# Patient Record
Sex: FEMALE | Race: WHITE | NOT HISPANIC OR LATINO | Employment: FULL TIME | URBAN - METROPOLITAN AREA
[De-identification: names, ages, dates, MRNs, and addresses within clinical notes are randomized per-mention and may not be internally consistent; named-entity substitution may affect disease eponyms.]

---

## 2017-03-10 ENCOUNTER — TELEPHONE (OUTPATIENT)
Dept: CARDIOLOGY | Facility: CLINIC | Age: 57
End: 2017-03-10

## 2017-03-10 DIAGNOSIS — I21.4 NSTEMI (NON-ST ELEVATED MYOCARDIAL INFARCTION) (H): ICD-10-CM

## 2017-03-10 RX ORDER — ATORVASTATIN CALCIUM 40 MG/1
40 TABLET, FILM COATED ORAL DAILY
Qty: 90 TABLET | Refills: 3 | Status: SHIPPED | OUTPATIENT
Start: 2017-03-10 | End: 2018-03-12

## 2017-03-10 NOTE — TELEPHONE ENCOUNTER
Received a vm from pt stating that she is out of her atorvastatin and would like a refill.    Writer called pt back and refilled atorvastatin 40mg to pt pharmacy of choice. Pt was thankful. Pt has no further questions or concerns at this time.

## 2017-03-27 DIAGNOSIS — E78.5 DYSLIPIDEMIA: ICD-10-CM

## 2017-03-27 LAB
CHOLEST SERPL-MCNC: 140 MG/DL
HDLC SERPL-MCNC: 50 MG/DL
LDLC SERPL CALC-MCNC: 43 MG/DL
NONHDLC SERPL-MCNC: 90 MG/DL
TRIGL SERPL-MCNC: 236 MG/DL

## 2017-03-27 PROCEDURE — 36415 COLL VENOUS BLD VENIPUNCTURE: CPT | Performed by: INTERNAL MEDICINE

## 2017-03-27 PROCEDURE — 80061 LIPID PANEL: CPT | Performed by: INTERNAL MEDICINE

## 2017-03-28 ENCOUNTER — TELEPHONE (OUTPATIENT)
Dept: CARDIOLOGY | Facility: CLINIC | Age: 57
End: 2017-03-28

## 2017-03-28 DIAGNOSIS — E78.2 MIXED HYPERLIPIDEMIA: Primary | ICD-10-CM

## 2017-03-28 NOTE — TELEPHONE ENCOUNTER
Notes Recorded by Dominick Huffman MD on 3/28/2017 at 12:37 AM  Improved FLP with better HDL.  Triglycerides remain elevated, possibly due to genetics or diet.  Suggest continued medical therapy, diet and exercise.  Would repeat her FLP in one year.  Dominick Huffman MD, [facility]  March 28, 2017 12:37 AM    Writer attempted to call pt with above result. No answer. Writer left non urgent vm to call team 4 back directly.     Pt called back. Writer informed her of Dr. Huffman above response. Writer placed order for FLP in one year. Pt was wondering if she was supposed to follow up in sept. Writer verified this and told pt to call around July to schedule. Pt verbalized understanding. No further questions or concerns at this time.

## 2017-03-30 ENCOUNTER — TELEPHONE (OUTPATIENT)
Dept: CARDIOLOGY | Facility: CLINIC | Age: 57
End: 2017-03-30

## 2017-03-30 ENCOUNTER — PRE VISIT (OUTPATIENT)
Dept: CARDIOLOGY | Facility: CLINIC | Age: 57
End: 2017-03-30

## 2017-03-30 NOTE — TELEPHONE ENCOUNTER
Pt called stating that she needs Dr. 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 Writer called pt back and informed pt that Dr. Huffman is currently out of the office and writer is unable to reach him today. Writer told pt that Dr. Huffman may order further testing prior to signing DOT clearance or may possibly want to see pt in the office. Pt verbalized understanding. Writer instructed pt that Dr. Huffman will be updated and see what he would like pt to do. Pt verbalized understanding and was thankful for a prompt response.     Writer noticed that Dr. Huffman had follow up appts available on 3/31/17. Writer attempted to call pt back to offer appt. No answer. Writer left vm to call team 4 back directly.     Pt called back and writer informed pt that Dr. Huffman has availability on 3/31/17 in [place]. Pt would like to be seen. [place] scheduled appt with Dr. Huffman on 3/31/17 at 12:45.    Chart reviewed: BROOKLYN Huffman 9/27/16:    ASSESSMENT:   1. Tania Azevedo is a delightful 55-year-old female status post inferior wall myocardial infarction in 02/2015 with an ejection fraction at that time of 40%-45%. A stress echocardiogram was then performed on 02/26/2016 demonstrating the old inferior wall MI, but no areas of ischemia. Her ejection fraction had improved to 55%-60% with moderate inferior wall hypokinesis. I have encouraged her to return to her exercise both for weight loss as well as her physical well-being and mental well-being.   2. Tobacco abuse. The patient continues to refrain from smoking.   3. Morbid obesity.       I spent greater than 50% of my 30-minute visit with her today, specifically discussing diet, exercise, weight loss and coronary artery disease.       The patient has the metabolic syndrome and I suspect she has pattern B lipids. Per the patient's request, we will delay checking her lipids at this time for 6 months as she tries to  adjust her lifestyle to all the changes.       It is my pleasure to assist in this patient's care. I will see her in 1 year with her lipids in 6 months. She is otherwise stable. She will call for recurrent angina. All her questions were answered to her satisfaction.     Writer will route to Dr. Huffman to update him that pt has an appt scheduled with him on 3/31/17 to discuss DOT clearance. Writer will see if  Dr. Huffman would like to keep this appt or if he would just like to do testing without seeing the patient in office.

## 2017-03-31 ENCOUNTER — OFFICE VISIT (OUTPATIENT)
Dept: CARDIOLOGY | Facility: CLINIC | Age: 57
End: 2017-03-31
Payer: COMMERCIAL

## 2017-03-31 VITALS
HEART RATE: 60 BPM | SYSTOLIC BLOOD PRESSURE: 160 MMHG | DIASTOLIC BLOOD PRESSURE: 80 MMHG | HEIGHT: 61 IN | WEIGHT: 214.4 LBS | BODY MASS INDEX: 40.48 KG/M2

## 2017-03-31 DIAGNOSIS — I10 ESSENTIAL HYPERTENSION: ICD-10-CM

## 2017-03-31 DIAGNOSIS — I25.10 CORONARY ARTERY DISEASE INVOLVING NATIVE CORONARY ARTERY OF NATIVE HEART WITHOUT ANGINA PECTORIS: Primary | ICD-10-CM

## 2017-03-31 DIAGNOSIS — I21.4 NSTEMI (NON-ST ELEVATED MYOCARDIAL INFARCTION) (H): ICD-10-CM

## 2017-03-31 DIAGNOSIS — E78.2 MIXED HYPERLIPIDEMIA: ICD-10-CM

## 2017-03-31 PROCEDURE — 99214 OFFICE O/P EST MOD 30 MIN: CPT | Performed by: INTERNAL MEDICINE

## 2017-03-31 RX ORDER — NITROGLYCERIN 0.4 MG/1
0.4 TABLET SUBLINGUAL EVERY 5 MIN PRN
Qty: 25 TABLET | Refills: 3 | Status: SHIPPED | OUTPATIENT
Start: 2017-03-31 | End: 2020-02-28

## 2017-03-31 NOTE — PROGRESS NOTES
HPI and Plan:   See dictation:[id number]    No orders of the defined types were placed in this encounter.      Orders Placed This Encounter   Medications     nitroglycerin (NITROSTAT) 0.4 MG sublingual tablet     Sig: Place 1 tablet (0.4 mg) under the tongue every 5 minutes as needed for chest pain     Dispense:  25 tablet     Refill:  3       Medications Discontinued During This Encounter   Medication Reason     oxyCODONE (ROXICODONE) 5 MG immediate release tablet      senna-docusate (SENOKOT S) 8.6-50 MG per tablet      nitroglycerin (NITROSTAT) 0.4 MG SL tablet Reorder         Encounter Diagnoses   Name Primary?     Coronary artery disease involving native coronary artery of native heart without angina pectoris Yes     Essential hypertension      NSTEMI (non-ST elevated myocardial infarction) (H)        CURRENT MEDICATIONS:  Current Outpatient Prescriptions   Medication Sig Dispense Refill     nitroglycerin (NITROSTAT) 0.4 MG sublingual tablet Place 1 tablet (0.4 mg) under the tongue every 5 minutes as needed for chest pain 25 tablet 3     atorvastatin (LIPITOR) 40 MG tablet Take 1 tablet (40 mg) by mouth daily 90 tablet 3     pantoprazole (PROTONIX) 40 MG enteric coated tablet Take 1 tablet (40 mg) by mouth daily 90 tablet 3     metoprolol (LOPRESSOR) 25 MG tablet Take 1 tablet (25 mg) by mouth 2 times daily 180 tablet 3     irbesartan (AVAPRO) 300 MG tablet Take 0.5 tablets (150 mg) by mouth daily 45 tablet 3     sertraline (ZOLOFT) 50 MG tablet Take 0.5 tablets (25 mg) by mouth daily 15 tablet 1     aspirin 81 MG chewable tablet Take 1 tablet (81 mg) by mouth daily 30 tablet 0     cetirizine (ZYRTEC) 10 MG tablet Take 10 mg by mouth daily.       benzonatate (TESSALON) 100 MG capsule Take 1 capsule by mouth 3 times daily as needed for cough. 20 capsule 0     albuterol (PROVENTIL HFA: VENTOLIN HFA) 108 (90 BASE) MCG/ACT inhaler Inhale 2 puffs into the lungs every 6 hours. 1 Inhaler 2     FLONASE INHA 50 MCG/DOSE NA  INHALE 2 SPRAYS IN EACH NOSTRIL ONCE DAILY (Patient taking differently: INHALE 2 SPRAYS IN EACH NOSTRIL ONCE DAILY PRN) 3 MONTHS 1 YEAR     [DISCONTINUED] nitroglycerin (NITROSTAT) 0.4 MG SL tablet Place 1 tablet (0.4 mg) under the tongue every 5 minutes as needed for chest pain 25 tablet 3       ALLERGIES     Allergies   Allergen Reactions     Codeine        PAST MEDICAL HISTORY:  Past Medical History:   Diagnosis Date     CAD (coronary artery disease)     stent 2/9/15     Depression      Dyslipidemia      Hypertension      NSTEMI (non-ST elevated myocardial infarction) (H) 02-06-15     Tobacco abuse        PAST SURGICAL HISTORY:  Past Surgical History:   Procedure Laterality Date     C ANESTH,RADICAL HYSTERECTOMY       COLECTOMY RIGHT Right 6/3/2016    Procedure: COLECTOMY RIGHT;  Surgeon: Dillan Vaughn MD;  Location: [facility] OR     HEART CATH LEFT HEART CATH  02-09-15    SHE to RCA, Aspiration thrombectomy of RCA, 20-30% proximal LAD narrowing, 30% mid LAD, 40% mid first diagonal, prox RCA was occluded with 30% in distal RCA      HEART CATH, ANGIOPLASTY       LAPAROSCOPIC APPENDECTOMY N/A 6/3/2016    Procedure: LAPAROSCOPIC APPENDECTOMY;  Surgeon: Dillan Vaughn MD;  Location: [facility] OR       FAMILY HISTORY:  Family History   Problem Relation Age of Onset     Unknown/Adopted Mother      Unknown/Adopted Father        SOCIAL HISTORY:  Social History     Social History     Marital status: Single     Spouse name: N/A     Number of children: N/A     Years of education: N/A     Social History Main Topics     Smoking status: Former Smoker     Types: Cigarettes     Smokeless tobacco: Never Used      Comment: 2/6/15 - quit e cig     Alcohol use No     Drug use: No     Sexual activity: Yes     Partners: Male     Birth control/ protection: Surgical     Other Topics Concern     Caffeine Concern Yes     1 can of diet soda and once in a while a coffee      Sleep Concern Yes     not lately     Stress Concern Yes      "Significant other - Terminal Cancer     Special Diet No     Exercise Yes     some 2 days per week      Seat Belt Yes     Social History Narrative       Review of Systems:  Skin:  Positive for   skin peeling around nose   Eyes:  Positive for glasses    ENT:  Positive for postnasal drainage;nasal congestion (pt feels like she has a cold) allergies  Respiratory:  Positive for dyspnea on exertion stairs   Cardiovascular:  Negative      Gastroenterology: Positive for diarrhea    Genitourinary:  Negative      Musculoskeletal:    joint pain;arthritis of both hips, plantar fasciitis  Neurologic:  Negative      Psychiatric:  Positive for depression;anxiety;excessive stress Significant other dx with Terminal Cancer  Heme/Lymph/Imm:  Negative      Endocrine:  Negative        Physical Exam:  Vitals: /80 (BP Location: Right arm, Cuff Size: Adult Large)  Pulse 60  Ht 1.549 m (5' 1\")  Wt 97.3 kg (214 lb 6.4 oz)  BMI 40.51 kg/m2    Constitutional:  cooperative, alert and oriented, well developed, well nourished, in no acute distress obese      Skin:  warm and dry to the touch, no apparent skin lesions or masses noted        Head:  normocephalic, no masses or lesions        Eyes:  pupils equal and round, conjunctivae and lids unremarkable, sclera white, no xanthalasma, EOMS intact, no nystagmus        ENT:  no pallor or cyanosis, dentition good        Neck:  carotid pulses are full and equal bilaterally, JVP normal, no carotid bruit, no thyromegaly        Chest:  normal breath sounds, clear to auscultation, normal A-P diameter, normal symmetry, normal respiratory excursion, no use of accessory muscles          Cardiac: regular rhythm;normal S1 and S2;apical impulse not displaced   S4 no presence of murmur       right radial site without hematoma or bruit    Abdomen:  abdomen soft, non-tender, BS normoactive, no mass, no HSM, no bruits        Vascular: pulses full and equal, no bruits auscultated                             "            Extremities and Back:  no deformities, clubbing, cyanosis, erythema observed;no edema              Neurological:  affect appropriate, oriented to time, person and place;no gross motor deficits              CC  No referring provider defined for this encounter.

## 2017-03-31 NOTE — MR AVS SNAPSHOT
"              After Visit Summary   3/31/2017    Tania Azevedo    MRN: [id number]           Patient Information     Date Of Birth          1960        Visit Information        Provider Department      3/31/2017 12:45 PM Dominick Huffman MD [facility]        Today's Diagnoses     Coronary artery disease involving native coronary artery of native heart without angina pectoris    -  1    Essential hypertension        NSTEMI (non-ST elevated myocardial infarction) (H)           Follow-ups after your visit        Who to contact     If you have questions or need follow up information about today's clinic visit or your schedule please contact [facility] directly at [phone].  Normal or non-critical lab and imaging results will be communicated to you by MyChart, letter or phone within 4 business days after the clinic has received the results. If you do not hear from us within 7 days, please contact the clinic through [employer] or phone. If you have a critical or abnormal lab result, we will notify you by phone as soon as possible.  Submit refill requests through [employer] or call your pharmacy and they will forward the refill request to us. Please allow 3 business days for your refill to be completed.          Additional Information About Your Visit        MyChart Information     [employer] lets you send messages to your doctor, view your test results, renew your prescriptions, schedule appointments and more. To sign up, go to [email or web address] . Click on \"Log in\" on the left side of the screen, which will take you to the Welcome page. Then click on \"Sign up Now\" on the right side of the page.     You will be asked to enter the access code listed below, as well as some personal information. Please follow the directions to create your username and password.     Your access code is: [email or web address]  Expires: 6/29/2017  1:27 PM     Your " "access code will  in 90 days. If you need help or a new code, please call your [place] clinic or [phone].        Care EveryWhere ID     This is your Care EveryWhere ID. This could be used by other organizations to access your [place] medical records  [id number]        Your Vitals Were     Pulse Height BMI (Body Mass Index)             60 1.549 m (5' 1\") 40.51 kg/m2          Blood Pressure from Last 3 Encounters:   17 160/80   16 135/70   16 158/81    Weight from Last 3 Encounters:   17 97.3 kg (214 lb 6.4 oz)   16 98.4 kg (217 lb)   16 100.6 kg (221 lb 12.8 oz)              Today, you had the following     No orders found for display         Today's Medication Changes          These changes are accurate as of: 3/31/17  1:27 PM.  If you have any questions, ask your nurse or doctor.               These medicines have changed or have updated prescriptions.        Dose/Directions    FLONASE 50 MCG/DOSE nasal spray   This may have changed:  See the new instructions.   Used for:  Other mental problems        INHALE 2 SPRAYS IN EACH NOSTRIL ONCE DAILY   Quantity:  3 MONTHS   Refills:  1 YEAR         Stop taking these medicines if you haven't already. Please contact your care team if you have questions.     oxyCODONE 5 MG IR tablet   Commonly known as:  ROXICODONE   Stopped by:  Dominick Huffman MD           senna-docusate 8.6-50 MG per tablet   Commonly known as:  SENOKOT S   Stopped by:  Dominick Huffman MD                Where to get your medicines      These medications were sent to [id number] - [place], [place] - [address]  [address] [address] [address]     Phone:  [phone]     nitroglycerin 0.4 MG sublingual tablet                Primary Care Provider Office Phone # Fax #    Swathi Ugarte [phone]       [facility] [id number] [place] [address]        Thank you!     Thank you for " choosing [facility] [place]  for your care. Our goal is always to provide you with excellent care. Hearing back from our patients is one way we can continue to improve our services. Please take a few minutes to complete the written survey that you may receive in the mail after your visit with us. Thank you!             Your Updated Medication List - Protect others around you: Learn how to safely use, store and throw away your medicines at www.disposemymeds.org.          This list is accurate as of: 3/31/17  1:27 PM.  Always use your most recent med list.                   Brand Name Dispense Instructions for use    albuterol 108 (90 BASE) MCG/ACT Inhaler    PROAIR HFA/PROVENTIL HFA/VENTOLIN HFA    1 Inhaler    Inhale 2 puffs into the lungs every 6 hours.       aspirin 81 MG chewable tablet     30 tablet    Take 1 tablet (81 mg) by mouth daily       atorvastatin 40 MG tablet    LIPITOR    90 tablet    Take 1 tablet (40 mg) by mouth daily       benzonatate 100 MG capsule    TESSALON    20 capsule    Take 1 capsule by mouth 3 times daily as needed for cough.       FLONASE 50 MCG/DOSE nasal spray     3 MONTHS    INHALE 2 SPRAYS IN EACH NOSTRIL ONCE DAILY       irbesartan 300 MG tablet    AVAPRO    45 tablet    Take 0.5 tablets (150 mg) by mouth daily       metoprolol 25 MG tablet    LOPRESSOR    180 tablet    Take 1 tablet (25 mg) by mouth 2 times daily       nitroglycerin 0.4 MG sublingual tablet    NITROSTAT    25 tablet    Place 1 tablet (0.4 mg) under the tongue every 5 minutes as needed for chest pain       pantoprazole 40 MG EC tablet    PROTONIX    90 tablet    Take 1 tablet (40 mg) by mouth daily       sertraline 50 MG tablet    ZOLOFT    15 tablet    Take 0.5 tablets (25 mg) by mouth daily       ZYRTEC 10 MG tablet   Generic drug:  cetirizine      Take 10 mg by mouth daily.

## 2017-03-31 NOTE — LETTER
3/31/2017    Swathi Ugarte, DO  [facility]   [address] [facility] [address]    RE: Tania Duffon       Dear Colleague,    I had the pleasure of seeing your patient, Tania Azevedo, at [facility] [place] for evaluation of coronary artery disease.  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  This patient is a 56-year-old female status post non-ST elevation myocardial infarction 2015.  We found a total occlusion of the right coronary artery with mild to moderate disease in her other vessels.  Intracoronary stenting and thrombectomy was performed to the right coronary artery.  She has been free of angina.  Her significant other developed terminal pancreatic cancer and  in December.  The patient has since moved to [place] to live with her daughter.  Her exercise routine has been disrupted.  She denies significant dyspnea on exertion, chest discomfort, palpitations, syncope or presyncope.  She was a long-term smoker and has not returned to smoking since her heart attack.  She continues to have trouble with excessive calories and portion sizes.  She denies myalgias and myopathy with her atorvastatin.  Her last fasting lipid profile on 2017 included total cholesterol 140, HDL 50, LDL 43, triglycerides 236.  We suspect that her high triglycerides are due to her weight and diet.      PHYSICAL EXAMINATION:   VITAL SIGNS:  Current blood pressure is 160/80 taken twice on the right arm and once on the left arm.  Pulse is 60 and regular.  Weight is 214 pounds, a decrease of 3 pounds from September.   CHEST:  Clear to auscultation.   CARDIAC:  Regular rate and rhythm, normal S1 and S2 with an S4 gallop but no S3 or murmur.  No JVD.  Pulses are intact without bruits.   ABDOMEN:  Obese, soft, nontender without organomegaly.   EXTREMITIES:  Without cyanosis, clubbing or edema.     Outpatient Encounter Prescriptions as of 3/31/2017   Medication Sig  Dispense Refill     nitroglycerin (NITROSTAT) 0.4 MG sublingual tablet Place 1 tablet (0.4 mg) under the tongue every 5 minutes as needed for chest pain 25 tablet 3     atorvastatin (LIPITOR) 40 MG tablet Take 1 tablet (40 mg) by mouth daily 90 tablet 3     pantoprazole (PROTONIX) 40 MG enteric coated tablet Take 1 tablet (40 mg) by mouth daily 90 tablet 3     metoprolol (LOPRESSOR) 25 MG tablet Take 1 tablet (25 mg) by mouth 2 times daily 180 tablet 3     irbesartan (AVAPRO) 300 MG tablet Take 0.5 tablets (150 mg) by mouth daily 45 tablet 3     sertraline (ZOLOFT) 50 MG tablet Take 0.5 tablets (25 mg) by mouth daily 15 tablet 1     aspirin 81 MG chewable tablet Take 1 tablet (81 mg) by mouth daily 30 tablet 0     cetirizine (ZYRTEC) 10 MG tablet Take 10 mg by mouth daily.       benzonatate (TESSALON) 100 MG capsule Take 1 capsule by mouth 3 times daily as needed for cough. 20 capsule 0     albuterol (PROVENTIL HFA: VENTOLIN HFA) 108 (90 BASE) MCG/ACT inhaler Inhale 2 puffs into the lungs every 6 hours. 1 Inhaler 2     FLONASE INHA 50 MCG/DOSE NA INHALE 2 SPRAYS IN EACH NOSTRIL ONCE DAILY (Patient taking differently: INHALE 2 SPRAYS IN EACH NOSTRIL ONCE DAILY PRN) 3 MONTHS 1 YEAR     [DISCONTINUED] oxyCODONE (ROXICODONE) 5 MG immediate release tablet Take 1-2 tablets (5-10 mg) by mouth every 3 hours as needed for moderate to severe pain 30 tablet 0     [DISCONTINUED] senna-docusate (SENOKOT S) 8.6-50 MG per tablet Take 1 tablet by mouth 2 times daily as needed for constipation 100 tablet 0     [DISCONTINUED] nitroglycerin (NITROSTAT) 0.4 MG SL tablet Place 1 tablet (0.4 mg) under the tongue every 5 minutes as needed for chest pain 25 tablet 3     No facility-administered encounter medications on file as of 3/31/2017.       ASSESSMENT:   1.  Tania Azevedo is a delightful 56-year-old female status post inferior wall myocardial infarction in 02/2015 with an ejection fraction initially of 40%-45%.  Her most recent stress  echocardiogram on 02/26/2016 demonstrated no ongoing ischemia.  There was inferior wall hypokinesis noted at rest and this did not change with exercise suggesting a prior inferior infarct without residual ischemia.  Her ejection fraction at rest was 55%-60%.  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  Her risk of myocardial infarction or heart failure is quite small.  She is tolerating all of her medications quite well and she is beyond 2 months after her MI.  I will fax this note to [facility] on her behalf.   2.  Tobacco abuse.  The patient continues to refrain from smoking.   3.  Morbid obesity.  I again spent greater than 50% of my 30-minute visit with her today specifically discussing diet, exercise and weight loss.  She is somewhat motivated and it is my intention to see her in 5 months to discuss how she is doing.   4.  Systolic hypertension.  This patient has not had systolic hypertension in the recent past.  We will see how she does with low-salt, low-calorie diet and weight loss.  We will review all of this with her in September and see if her blood pressure is under better control.            It is my pleasure to assist in the care of Tania BOONEKathy Roberto Carlos.  All her questions were answered to her satisfaction.     Sincerely,    Dominick Huffman MD     [facility]    cc:   [facility]    Guillermo Baez    Fax [phone]

## 2017-04-01 NOTE — PROGRESS NOTES
HISTORY OF PRESENT ILLNESS:  I had the pleasure of seeing your patient, Tania Azevedo, at [facility] [place] for evaluation of coronary artery disease.  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  This patient is a 56-year-old female status post non-ST elevation myocardial infarction 2015.  We found a total occlusion of the right coronary artery with mild to moderate disease in her other vessels.  Intracoronary stenting and thrombectomy was performed to the right coronary artery.  She has been free of angina.  Her significant other developed terminal pancreatic cancer and  in December.  The patient has since moved to [place] to live with her daughter.  Her exercise routine has been disrupted.  She denies significant dyspnea on exertion, chest discomfort, palpitations, syncope or presyncope.  She was a long-term smoker and has not returned to smoking since her heart attack.  She continues to have trouble with excessive calories and portion sizes.  She denies myalgias and myopathy with her atorvastatin.  Her last fasting lipid profile on 2017 included total cholesterol 140, HDL 50, LDL 43, triglycerides 236.  We suspect that her high triglycerides are due to her weight and diet.      PHYSICAL EXAMINATION:   VITAL SIGNS:  Current blood pressure is 160/80 taken twice on the right arm and once on the left arm.  Pulse is 60 and regular.  Weight is 214 pounds, a decrease of 3 pounds from September.   CHEST:  Clear to auscultation.   CARDIAC:  Regular rate and rhythm, normal S1 and S2 with an S4 gallop but no S3 or murmur.  No JVD.  Pulses are intact without bruits.   ABDOMEN:  Obese, soft, nontender without organomegaly.   EXTREMITIES:  Without cyanosis, clubbing or edema.      ASSESSMENT:   1.  Tania Azevedo is a delightful 56-year-old female status post inferior wall myocardial infarction in 2015 with an ejection fraction initially of 40%-45%.  Her most recent  stress echocardiogram on 2016 demonstrated no ongoing ischemia.  There was inferior wall hypokinesis noted at rest and this did not change with exercise suggesting a prior inferior infarct without residual ischemia.  Her ejection fraction at rest was 55%-60%.  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  Her risk of myocardial infarction or heart failure is quite small.  She is tolerating all of her medications quite well and she is beyond 2 months after her MI.  I will fax this note to [facility] on her behalf.   2.  Tobacco abuse.  The patient continues to refrain from smoking.   3.  Morbid obesity.  I again spent greater than 50% of my 30-minute visit with her today specifically discussing diet, exercise and weight loss.  She is somewhat motivated and it is my intention to see her in 5 months to discuss how she is doing.   4.  Systolic hypertension.  This patient has not had systolic hypertension in the recent past.  We will see how she does with low-salt, low-calorie diet and weight loss.  We will review all of this with her in September and see if her blood pressure is under better control.      It is my pleasure to assist in the care of Tania Araujo.  All her questions were answered to her satisfaction.      cc:   Swathi Ugarte, DO [facility] [place] [address] [address] [facility]    [place]    Fax [phone]         CYNDI LAWSON MD, [facility]             D: 2017 13:44   T: 2017 21:39   MT: KIM      Name:     TANIA ARAUJO   MRN:      [id number]        Account:      [id number]   :      1960           Service Date: 2017      Document: [id number]

## 2017-04-04 ENCOUNTER — TELEPHONE (OUTPATIENT)
Dept: CARDIOLOGY | Facility: CLINIC | Age: 57
End: 2017-04-04

## 2017-04-04 NOTE — TELEPHONE ENCOUNTER
Pt called to see if Dr. Huffman faxed her DOT letter. Reviewed chart and verified he did yesterday. No further questions.

## 2017-04-12 DIAGNOSIS — I10 BENIGN ESSENTIAL HYPERTENSION: ICD-10-CM

## 2017-04-12 RX ORDER — IRBESARTAN 300 MG/1
150 TABLET ORAL DAILY
Qty: 45 TABLET | Refills: 3 | Status: SHIPPED | OUTPATIENT
Start: 2017-04-12 | End: 2018-04-12

## 2017-04-12 NOTE — TELEPHONE ENCOUNTER
Received refill request for:  Irbesartan  Last OV was: 3/31/2017 with Dr. Huffman  Labs/EKG: last BMP 6/6/2016  F/U scheduled: orders in Epic for 9/2017  New script sent to: [facility]

## 2017-07-05 ENCOUNTER — CARE COORDINATION (OUTPATIENT)
Dept: CARDIOLOGY | Facility: CLINIC | Age: 57
End: 2017-07-05

## 2017-07-05 NOTE — PROGRESS NOTES
Pt called stating that she went to her PMD for a BP check and her BP was 150/90 and 5 minutes later 140/84. Pt stated that she feels as though she is retaining fluids these last few weeks. Pt noticed that her left hand has been super swollen. Pt started keeping it raised and that has helped. Writer asked patient if she has been eating more salty foods than usual. Pt stated that she recently did have chinese but other than that she has had her normal diet. Pt did not noticed any swelling on her lower extremities but thinking back her socks have been giving her ankles indents. Pt wondering if she should try a water pill? Pt stated that she recently went to the dentist and he put her on a z artemio as he thinks she may have a sinus infection.     PMD in Care everywhere     Pt stated it was ok to leave detailed message with any recommendations Dr. Huffman may have.     3/21/17 OV with Dr. Huffman   ASSESSMENT:   1.  aTnia Azevedo is a delightful 56-year-old female status post inferior wall myocardial infarction in 02/2015 with an ejection fraction initially of 40%-45%.  Her most recent stress echocardiogram on 02/26/2016 demonstrated no ongoing ischemia.  There was inferior wall hypokinesis noted at rest and this did not change with exercise suggesting a prior inferior infarct without residual ischemia.  Her ejection fraction at rest was 55%-60%.  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  Her risk of myocardial infarction or heart failure is quite small.  She is tolerating all of her medications quite well and she is beyond 2 months after her MI.  I will fax this note to [facility] on her behalf.   2.  Tobacco abuse.  The patient continues to refrain from smoking.   3.  Morbid obesity.  I again spent greater than 50% of my 30-minute visit with her today specifically discussing diet, exercise and weight loss.  She is somewhat motivated and it is my intention to see her in 5  months to discuss how she is doing.   4.  Systolic hypertension.  This patient has not had systolic hypertension in the recent past.  We will see how she does with low-salt, low-calorie diet and weight loss.  We will review all of this with her in September and see if her blood pressure is under better control.    Will route to Dr. Huffman to review.

## 2017-07-05 NOTE — TELEPHONE ENCOUNTER
It would be great to have this patient see my NP and decide if HCTZ would be a helpful addition for edema and hypertension.  Thanks.  Dominick Huffman MD, FACC  July 5, 2017 4:46 PM

## 2017-07-06 NOTE — PROGRESS NOTES
"Spoke to Dr. Huffman  And he stated    \"It would be great to have this patient see my NP and decide if HCTZ would be a helpful addition for edema and hypertension.  Thanks.  Dominick Huffman MD, formerly [facility]  July 5, 2017 4:46 PM\"    Patient agrees to plan and scheduled with Gabriela Reich NP for 7/10/17. Team 4 number given for further questions or concerns.   "

## 2017-07-10 ENCOUNTER — OFFICE VISIT (OUTPATIENT)
Dept: CARDIOLOGY | Facility: CLINIC | Age: 57
End: 2017-07-10
Payer: COMMERCIAL

## 2017-07-10 VITALS
SYSTOLIC BLOOD PRESSURE: 146 MMHG | HEIGHT: 61 IN | DIASTOLIC BLOOD PRESSURE: 90 MMHG | HEART RATE: 64 BPM | BODY MASS INDEX: 39.65 KG/M2 | WEIGHT: 210 LBS

## 2017-07-10 DIAGNOSIS — I25.10 CORONARY ARTERY DISEASE INVOLVING NATIVE CORONARY ARTERY OF NATIVE HEART WITHOUT ANGINA PECTORIS: ICD-10-CM

## 2017-07-10 DIAGNOSIS — I10 ESSENTIAL HYPERTENSION: ICD-10-CM

## 2017-07-10 DIAGNOSIS — E78.2 MIXED HYPERLIPIDEMIA: Primary | ICD-10-CM

## 2017-07-10 DIAGNOSIS — I25.5 ISCHEMIC CARDIOMYOPATHY: ICD-10-CM

## 2017-07-10 PROCEDURE — 99213 OFFICE O/P EST LOW 20 MIN: CPT | Performed by: NURSE PRACTITIONER

## 2017-07-10 RX ORDER — HYDROCHLOROTHIAZIDE 25 MG/1
12.5 TABLET ORAL DAILY
Qty: 45 TABLET | Refills: 1 | Status: SHIPPED | OUTPATIENT
Start: 2017-07-10 | End: 2018-01-05

## 2017-07-10 NOTE — LETTER
7/10/2017    Swathi Ugarte, DO  Critical access hospital   [address] [facility] [address]    RE: Tania C Roberto Carlos       Dear Colleague,    I had the pleasure of seeing Tania Azevedo in the [facility].      Primary Provider:Deirdre Ugarte  Primary Cardiology: Dr. Huffman    REASON FOR VIST/CHIEF COMPLAINT-Hypertension management.    HPI:   I had the pleasure of seeing your patient, Tania Azevedo, at [facility] for evaluation of hypertension This patient is a 56-year-old female status post non-ST elevation myocardial infarction 2015.  We found a total occlusion of the right coronary artery with mild to moderate disease in her other vessels.  Intracoronary stenting and thrombectomy was performed to the right coronary artery.  She has been free of angina.  Her significant other developed terminal pancreatic cancer and  in December.  The patient has since moved to [place] to live with her daughter.  Her exercise routine has been disrupted.  She denies significant dyspnea on exertion, chest discomfort, palpitations, syncope or presyncope.  She was a long-term smoker and has not returned to smoking since her heart attack.  She continues to have trouble with excessive calories and portion sizes.  She denies myalgias and myopathy with her atorvastatin.  Her last fasting lipid profile on 2017 included total cholesterol 140, HDL 50, LDL 43, triglycerides 236.  We suspect that her high triglycerides are due to her weight and diet.   Today she is here to talk about hypertension. She brings a couple of blood pressure recordings since she was seen by Dr. Huffman in March.    Today her blood pressure was checked ×2. The first time it was 149/90, the second blood pressure reading was 146/90.    Cardiovascular medication  #1. Avapro 150 q. day  #2 atorvastatin 40 mg daily  #3 metoprolol 25 mg b.i.d.      Diagnostic Data none    ASSESSMENT  Tania Azevedo  is a 56-year-old female with a history of coronary artery disease who is here today to talk about hypertension management. Today I'm going to add hydrochlorothiazide 12.5 mg once a day. We talked about low sodium diet. Smaller portions and  value of being more active for weight loss and hypertension management.    PLAN:.    Start Hydrochlorothiazide 12.5 mg once a day    Lab work in 10 days I will call with results. If her BMP looks okay I will increase her hydrochlorothiazide to 25 mg daily.    Call us if you feel light headed or dizzy.        See Dr. Huffman in September as scheduled            Primary Provider: Swathi Ugarte  Primary Cardiology: Dr. Dominick Huffman    REASON FOR VIST/CHIEF COMPLAINT blood pressure management    HPI: Tania Azevedo is a 56-year-old woman who is here today for further hypertension management. 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 Her cardiovascular history includes non-ST elevation myocardial infarction in February 2015. Cardiac risk factors include obesity hypertension hyperlipidemia she quit smoking with her myocardial infarction in 2015.    Dr. Huffman was concerned about her blood pressure and asked her to come back to clinic for another evaluation and to discuss adding hydrochlorothiazide.    Tania brings along a couple of blood pressure readings. Her systolic pressure remains in the 140s.    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 She says she is poor active with this DOT license. She's been able to lose 7 pounds since last fall.      Diagnostic Data; none    ASSESSMENT  Patient with history of myocardial infarction with important risk factors that include hypertension.    PLAN:  1. hydrochlorothiazide 12.5 mg once a day.  2. Lab work in 10 days. BMP, I will call her with results. If the lab work is normal I will increase her hydrochlorothiazide to 25 mg once a day.   3. She has a  follow-up appointment already scheduled with Dr. Huffman  in September.    I've asked her to call us if she has symptoms of lightheadedness or dizziness.    I congratulated her on her weight loss. She appears motivated to continue losing weight by eating more healthy and staying active.    Orders Placed This Encounter   Procedures     Basic metabolic panel       Orders Placed This Encounter   Medications     FLUCONAZOLE PO     Sig: Take 100 mg by mouth 2 tablets / as directed     AZITHROMYCIN PO     Sig: Take 250 mg by mouth Last dose 7/9/17     hydrochlorothiazide (HYDRODIURIL) 25 MG tablet     Sig: Take 0.5 tablets (12.5 mg) by mouth daily     Dispense:  45 tablet     Refill:  1       There are no discontinued medications.      Encounter Diagnoses   Name Primary?     Mixed hyperlipidemia Yes     Coronary artery disease involving native coronary artery of native heart without angina pectoris      Essential hypertension      Ischemic cardiomyopathy        CURRENT MEDICATIONS:  Current Outpatient Prescriptions   Medication Sig Dispense Refill     FLUCONAZOLE PO Take 100 mg by mouth 2 tablets / as directed       AZITHROMYCIN PO Take 250 mg by mouth Last dose 7/9/17       hydrochlorothiazide (HYDRODIURIL) 25 MG tablet Take 0.5 tablets (12.5 mg) by mouth daily 45 tablet 1     irbesartan (AVAPRO) 300 MG tablet Take 0.5 tablets (150 mg) by mouth daily 45 tablet 3     nitroglycerin (NITROSTAT) 0.4 MG sublingual tablet Place 1 tablet (0.4 mg) under the tongue every 5 minutes as needed for chest pain 25 tablet 3     atorvastatin (LIPITOR) 40 MG tablet Take 1 tablet (40 mg) by mouth daily 90 tablet 3     pantoprazole (PROTONIX) 40 MG enteric coated tablet Take 1 tablet (40 mg) by mouth daily 90 tablet 3     metoprolol (LOPRESSOR) 25 MG tablet Take 1 tablet (25 mg) by mouth 2 times daily 180 tablet 3     sertraline (ZOLOFT) 50 MG tablet Take 0.5 tablets (25 mg) by mouth daily 15 tablet 1     aspirin 81 MG chewable tablet Take  1 tablet (81 mg) by mouth daily 30 tablet 0     cetirizine (ZYRTEC) 10 MG tablet Take 10 mg by mouth daily.       benzonatate (TESSALON) 100 MG capsule Take 1 capsule by mouth 3 times daily as needed for cough. 20 capsule 0     albuterol (PROVENTIL HFA: VENTOLIN HFA) 108 (90 BASE) MCG/ACT inhaler Inhale 2 puffs into the lungs every 6 hours. 1 Inhaler 2     FLONASE INHA 50 MCG/DOSE NA INHALE 2 SPRAYS IN EACH NOSTRIL ONCE DAILY (Patient taking differently: INHALE 2 SPRAYS IN EACH NOSTRIL ONCE DAILY PRN) 3 MONTHS 1 YEAR       ALLERGIES     Allergies   Allergen Reactions     Codeine        PAST MEDICAL HISTORY:  Past Medical History:   Diagnosis Date     CAD (coronary artery disease)     stent 2/9/15     Depression      Dyslipidemia      Hypertension      NSTEMI (non-ST elevated myocardial infarction) (H) 02-06-15     Tobacco abuse        PAST SURGICAL HISTORY:  Past Surgical History:   Procedure Laterality Date     C ANESTH,RADICAL HYSTERECTOMY       COLECTOMY RIGHT Right 6/3/2016    Procedure: COLECTOMY RIGHT;  Surgeon: Dillan Vaughn MD;  Location: [facility] OR     HEART CATH LEFT HEART CATH  02-09-15    SHE to RCA, Aspiration thrombectomy of RCA, 20-30% proximal LAD narrowing, 30% mid LAD, 40% mid first diagonal, prox RCA was occluded with 30% in distal RCA      HEART CATH, ANGIOPLASTY       LAPAROSCOPIC APPENDECTOMY N/A 6/3/2016    Procedure: LAPAROSCOPIC APPENDECTOMY;  Surgeon: Dillan Vaughn MD;  Location: [facility] OR       FAMILY HISTORY:  Family History   Problem Relation Age of Onset     Unknown/Adopted Mother      Unknown/Adopted Father        SOCIAL HISTORY:  Social History     Social History     Marital status: Single     Spouse name: N/A     Number of children: N/A     Years of education: N/A     Social History Main Topics     Smoking status: Former Smoker     Types: Cigarettes     Smokeless tobacco: Never Used      Comment: 2/6/15 - quit e cig     Alcohol use No     Drug use: No     Sexual  "activity: Yes     Partners: Male     Birth control/ protection: Surgical     Other Topics Concern     Caffeine Concern Yes     1 can of diet soda and once in a while a coffee      Sleep Concern Yes     not lately     Stress Concern Yes     Significant other - Terminal Cancer     Special Diet No     Exercise Yes     some 2 days per week      Seat Belt Yes     Social History Narrative       Review of Systems:  Skin:  Negative       Eyes:  Positive for glasses    ENT:  Positive for   allergies  Respiratory:  Negative       Cardiovascular:    Positive for;edema    Gastroenterology: Negative      Genitourinary:  not assessed      Musculoskeletal:  Positive for   of both hips, plantar fasciitis  Neurologic:  Negative      Psychiatric:  Positive for depression;anxiety;excessive stress    Heme/Lymph/Imm:  Negative      Endocrine:  Negative        Physical Exam:  Vitals: /90  Pulse 64  Ht 1.549 m (5' 1\")  Wt 95.3 kg (210 lb)  BMI 39.68 kg/m2    Constitutional:           Skin:           Head:           Eyes:           ENT:           Neck:           Chest:             Cardiac:                    Abdomen:           Vascular:                                          Extremities and Back:                 Neurological:           Thank you for allowing me to participate in the care of your patient.    Sincerely,     NELLA Kaur CNP     [facility]    "

## 2017-07-10 NOTE — PROGRESS NOTES
Primary Provider:Deirdre Ugarte  Primary Cardiology: Dr. Huffman    REASON FOR VIST/CHIEF COMPLAINT-Hypertension management.    HPI:   I had the pleasure of seeing your patient, Tania Azevedo, at [facility] for evaluation of hypertension This patient is a 56-year-old female status post non-ST elevation myocardial infarction 2015.  We found a total occlusion of the right coronary artery with mild to moderate disease in her other vessels.  Intracoronary stenting and thrombectomy was performed to the right coronary artery.  She has been free of angina.  Her significant other developed terminal pancreatic cancer and  in December.  The patient has since moved to [place] to live with her daughter.  Her exercise routine has been disrupted.  She denies significant dyspnea on exertion, chest discomfort, palpitations, syncope or presyncope.  She was a long-term smoker and has not returned to smoking since her heart attack.  She continues to have trouble with excessive calories and portion sizes.  She denies myalgias and myopathy with her atorvastatin.  Her last fasting lipid profile on 2017 included total cholesterol 140, HDL 50, LDL 43, triglycerides 236.  We suspect that her high triglycerides are due to her weight and diet.   Today she is here to talk about hypertension. She brings a couple of blood pressure recordings since she was seen by Dr. Huffman in March.    Today her blood pressure was checked ×2. The first time it was 149/90, the second blood pressure reading was 146/90.    Cardiovascular medication  #1. Avapro 150 q. day  #2 atorvastatin 40 mg daily  #3 metoprolol 25 mg b.i.d.      Diagnostic Data none    ASSESSMENT  Tania Azevedo is a 56-year-old female with a history of coronary artery disease who is here today to talk about hypertension management. Today I'm going to add hydrochlorothiazide 12.5 mg once a day. We talked about low sodium diet. Smaller portions and   value of being more active for weight loss and hypertension management.    PLAN:.    Start Hydrochlorothiazide 12.5 mg once a day    Lab work in 10 days I will call with results. If her BMP looks okay I will increase her hydrochlorothiazide to 25 mg daily.    Call us if you feel light headed or dizzy.        See Dr. Huffman in September as scheduled

## 2017-07-10 NOTE — PATIENT INSTRUCTIONS
Start Hydrochlorothiazide 12.5 mg once a day    Lab work in 10 days I will call with results.    Call us if you feel light headed or dizzy.

## 2017-07-10 NOTE — PROGRESS NOTES
Primary Provider: Swathi Ugarte  Primary Cardiology: Dr. Dominick Huffman    REASON FOR VIST/CHIEF COMPLAINT blood pressure management    HPI: Tania Azevedo is a 56-year-old woman who is here today for further hypertension management. 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 Her cardiovascular history includes non-ST elevation myocardial infarction in February 2015. Cardiac risk factors include obesity hypertension hyperlipidemia she quit smoking with her myocardial infarction in 2015.    Dr. Huffman was concerned about her blood pressure and asked her to come back to clinic for another evaluation and to discuss adding hydrochlorothiazide.    Tania brings along a couple of blood pressure readings. Her systolic pressure remains in the 140s.    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 She says she is poor active with this DOT license. She's been able to lose 7 pounds since last fall.      Diagnostic Data; none    ASSESSMENT  Patient with history of myocardial infarction with important risk factors that include hypertension.    PLAN:  1. hydrochlorothiazide 12.5 mg once a day.  2. Lab work in 10 days. BMP, I will call her with results. If the lab work is normal I will increase her hydrochlorothiazide to 25 mg once a day.   3. She has a follow-up appointment already scheduled with Dr. Huffman  in September.    I've asked her to call us if she has symptoms of lightheadedness or dizziness.    I congratulated her on her weight loss. She appears motivated to continue losing weight by eating more healthy and staying active.    Orders Placed This Encounter   Procedures     Basic metabolic panel       Orders Placed This Encounter   Medications     FLUCONAZOLE PO     Sig: Take 100 mg by mouth 2 tablets / as directed     AZITHROMYCIN PO     Sig: Take 250 mg by mouth Last dose 7/9/17     hydrochlorothiazide (HYDRODIURIL) 25 MG tablet      Sig: Take 0.5 tablets (12.5 mg) by mouth daily     Dispense:  45 tablet     Refill:  1       There are no discontinued medications.      Encounter Diagnoses   Name Primary?     Mixed hyperlipidemia Yes     Coronary artery disease involving native coronary artery of native heart without angina pectoris      Essential hypertension      Ischemic cardiomyopathy        CURRENT MEDICATIONS:  Current Outpatient Prescriptions   Medication Sig Dispense Refill     FLUCONAZOLE PO Take 100 mg by mouth 2 tablets / as directed       AZITHROMYCIN PO Take 250 mg by mouth Last dose 7/9/17       hydrochlorothiazide (HYDRODIURIL) 25 MG tablet Take 0.5 tablets (12.5 mg) by mouth daily 45 tablet 1     irbesartan (AVAPRO) 300 MG tablet Take 0.5 tablets (150 mg) by mouth daily 45 tablet 3     nitroglycerin (NITROSTAT) 0.4 MG sublingual tablet Place 1 tablet (0.4 mg) under the tongue every 5 minutes as needed for chest pain 25 tablet 3     atorvastatin (LIPITOR) 40 MG tablet Take 1 tablet (40 mg) by mouth daily 90 tablet 3     pantoprazole (PROTONIX) 40 MG enteric coated tablet Take 1 tablet (40 mg) by mouth daily 90 tablet 3     metoprolol (LOPRESSOR) 25 MG tablet Take 1 tablet (25 mg) by mouth 2 times daily 180 tablet 3     sertraline (ZOLOFT) 50 MG tablet Take 0.5 tablets (25 mg) by mouth daily 15 tablet 1     aspirin 81 MG chewable tablet Take 1 tablet (81 mg) by mouth daily 30 tablet 0     cetirizine (ZYRTEC) 10 MG tablet Take 10 mg by mouth daily.       benzonatate (TESSALON) 100 MG capsule Take 1 capsule by mouth 3 times daily as needed for cough. 20 capsule 0     albuterol (PROVENTIL HFA: VENTOLIN HFA) 108 (90 BASE) MCG/ACT inhaler Inhale 2 puffs into the lungs every 6 hours. 1 Inhaler 2     FLONASE INHA 50 MCG/DOSE NA INHALE 2 SPRAYS IN EACH NOSTRIL ONCE DAILY (Patient taking differently: INHALE 2 SPRAYS IN EACH NOSTRIL ONCE DAILY PRN) 3 MONTHS 1 YEAR       ALLERGIES     Allergies   Allergen Reactions     Codeine        PAST  MEDICAL HISTORY:  Past Medical History:   Diagnosis Date     CAD (coronary artery disease)     stent 2/9/15     Depression      Dyslipidemia      Hypertension      NSTEMI (non-ST elevated myocardial infarction) (H) 02-06-15     Tobacco abuse        PAST SURGICAL HISTORY:  Past Surgical History:   Procedure Laterality Date     C ANESTH,RADICAL HYSTERECTOMY       COLECTOMY RIGHT Right 6/3/2016    Procedure: COLECTOMY RIGHT;  Surgeon: Dillan Vaughn MD;  Location: [facility] OR     HEART CATH LEFT HEART CATH  02-09-15    SHE to RCA, Aspiration thrombectomy of RCA, 20-30% proximal LAD narrowing, 30% mid LAD, 40% mid first diagonal, prox RCA was occluded with 30% in distal RCA      HEART CATH, ANGIOPLASTY       LAPAROSCOPIC APPENDECTOMY N/A 6/3/2016    Procedure: LAPAROSCOPIC APPENDECTOMY;  Surgeon: Dillan Vaughn MD;  Location: [facility] OR       FAMILY HISTORY:  Family History   Problem Relation Age of Onset     Unknown/Adopted Mother      Unknown/Adopted Father        SOCIAL HISTORY:  Social History     Social History     Marital status: Single     Spouse name: N/A     Number of children: N/A     Years of education: N/A     Social History Main Topics     Smoking status: Former Smoker     Types: Cigarettes     Smokeless tobacco: Never Used      Comment: 2/6/15 - quit e cig     Alcohol use No     Drug use: No     Sexual activity: Yes     Partners: Male     Birth control/ protection: Surgical     Other Topics Concern     Caffeine Concern Yes     1 can of diet soda and once in a while a coffee      Sleep Concern Yes     not lately     Stress Concern Yes     Significant other - Terminal Cancer     Special Diet No     Exercise Yes     some 2 days per week      Seat Belt Yes     Social History Narrative       Review of Systems:  Skin:  Negative       Eyes:  Positive for glasses    ENT:  Positive for   allergies  Respiratory:  Negative       Cardiovascular:    Positive for;edema    Gastroenterology: Negative     "  Genitourinary:  not assessed      Musculoskeletal:  Positive for   of both hips, plantar fasciitis  Neurologic:  Negative      Psychiatric:  Positive for depression;anxiety;excessive stress    Heme/Lymph/Imm:  Negative      Endocrine:  Negative        Physical Exam:  Vitals: /90  Pulse 64  Ht 1.549 m (5' 1\")  Wt 95.3 kg (210 lb)  BMI 39.68 kg/m2    Constitutional:           Skin:           Head:           Eyes:           ENT:           Neck:           Chest:             Cardiac:                    Abdomen:           Vascular:                                          Extremities and Back:                 Neurological:                 CC  Please send a copy of this note to the primary provider thank you              "

## 2017-07-10 NOTE — MR AVS SNAPSHOT
After Visit Summary   7/10/2017    Tania Azevedo    MRN: [id number]           Patient Information     Date Of Birth          1960        Visit Information        Provider Department      7/10/2017 3:50 PM Gabriela Reich APRN CNP [facility]        Today's Diagnoses     Mixed hyperlipidemia    -  1    Coronary artery disease involving native coronary artery of native heart without angina pectoris        Essential hypertension        Ischemic cardiomyopathy          Care Instructions    Start Hydrochlorothiazide 12.5 mg once a day    Lab work in 10 days I will call with results.    Call us if you feel light headed or dizzy.    See Dr. Huffman in September as scheduled          Follow-ups after your visit        Your next 10 appointments already scheduled     Sep 26, 2017  3:15 PM CDT   Return Visit with Dominick Huffman MD   [facility] ([facility])    [address] [place] [address] [phone]              Who to contact     If you have questions or need follow up information about today's clinic visit or your schedule please contact [facility] directly at [phone].  Normal or non-critical lab and imaging results will be communicated to you by vArmourhart, letter or phone within 4 business days after the clinic has received the results. If you do not hear from us within 7 days, please contact the clinic through [employer]estmark[employer] or phone. If you have a critical or abnormal lab result, we will notify you by phone as soon as possible.  Submit refill requests through [employer] or call your pharmacy and they will forward the refill request to us. Please allow 3 business days for your refill to be completed.          Additional Information About Your Visit        vArmourhart Information     [employer] lets you send messages to your doctor, view your test  "results, renew your prescriptions, schedule appointments and more. To sign up, go to [email or web address] . Click on \"Log in\" on the left side of the screen, which will take you to the Welcome page. Then click on \"Sign up Now\" on the right side of the page.     You will be asked to enter the access code listed below, as well as some personal information. Please follow the directions to create your username and password.     Your access code is: [email or web address]  Expires: 10/8/2017  4:10 PM     Your access code will  in 90 days. If you need help or a new code, please call your [facility] clinic or [phone].        Care EveryWhere ID     This is your Care EveryWhere ID. This could be used by other organizations to access your [facility] medical records  [id number]        Your Vitals Were     Pulse Height BMI (Body Mass Index)             64 1.549 m (5' 1\") 39.68 kg/m2          Blood Pressure from Last 3 Encounters:   07/10/17 142/90   17 160/80   16 135/70    Weight from Last 3 Encounters:   07/10/17 95.3 kg (210 lb)   17 97.3 kg (214 lb 6.4 oz)   16 98.4 kg (217 lb)              Today, you had the following     No orders found for display         Today's Medication Changes          These changes are accurate as of: 7/10/17  4:10 PM.  If you have any questions, ask your nurse or doctor.               Start taking these medicines.        Dose/Directions    hydrochlorothiazide 25 MG tablet   Commonly known as:  HYDRODIURIL   Used for:  Essential hypertension   Started by:  Gabriela Reich, APRN CNP        Dose:  12.5 mg   Take 0.5 tablets (12.5 mg) by mouth daily   Quantity:  45 tablet   Refills:  1         These medicines have changed or have updated prescriptions.        Dose/Directions    FLONASE 50 MCG/DOSE nasal spray   This may have changed:  See the new instructions.   Used for:  Other mental problems        INHALE 2 SPRAYS IN EACH NOSTRIL ONCE DAILY   Quantity:  3 MONTHS   Refills: "  1 YEAR            Where to get your medicines      These medications were sent to [id number] - [place], [place] - [address]  [address] [place] [address]     Phone:  [phone]     hydrochlorothiazide 25 MG tablet                Primary Care Provider Office Phone # Fax #    Swathi Ugarte [phone]       [facility] [address] [facility] [address]        Equal Access to Services     TAMIKO COLLAZO : Hadii aad ku hadasho Soomaali, waaxda luqadaha, qaybta kaalmada adeegyada, waxay idiin hayaan adeeg kharash lacecy . So [facility] [phone].    ATENCIÓN: Si eddie español, tiene a ahuja disposición servicios gratuitos de asistencia lingüística. [facility] [phone].    We comply with applicable federal civil rights laws and [place] laws. We do not discriminate on the basis of race, color, national origin, age, disability sex, sexual orientation or gender identity.            Thank you!     Thank you for choosing [facility] [place]  for your care. Our goal is always to provide you with excellent care. Hearing back from our patients is one way we can continue to improve our services. Please take a few minutes to complete the written survey that you may receive in the mail after your visit with us. Thank you!             Your Updated Medication List - Protect others around you: Learn how to safely use, store and throw away your medicines at www.disposemymeds.org.          This list is accurate as of: 7/10/17  4:10 PM.  Always use your most recent med list.                   Brand Name Dispense Instructions for use Diagnosis    albuterol 108 (90 BASE) MCG/ACT Inhaler    PROAIR HFA/PROVENTIL HFA/VENTOLIN HFA    1 Inhaler    Inhale 2 puffs into the lungs every 6 hours.    Bronchitis with bronchospasm       aspirin 81 MG chewable tablet     30 tablet    Take 1 tablet (81 mg) by mouth daily    NSTEMI (non-ST elevated  myocardial infarction) (H)       atorvastatin 40 MG tablet    LIPITOR    90 tablet    Take 1 tablet (40 mg) by mouth daily    NSTEMI (non-ST elevated myocardial infarction) (H)       AZITHROMYCIN PO      Take 250 mg by mouth Last dose 7/9/17        benzonatate 100 MG capsule    TESSALON    20 capsule    Take 1 capsule by mouth 3 times daily as needed for cough.    Bronchitis with bronchospasm       FLONASE 50 MCG/DOSE nasal spray     3 MONTHS    INHALE 2 SPRAYS IN EACH NOSTRIL ONCE DAILY    Other mental problems       FLUCONAZOLE PO      Take 100 mg by mouth 2 tablets / as directed        hydrochlorothiazide 25 MG tablet    HYDRODIURIL    45 tablet    Take 0.5 tablets (12.5 mg) by mouth daily    Essential hypertension       irbesartan 300 MG tablet    AVAPRO    45 tablet    Take 0.5 tablets (150 mg) by mouth daily    Benign essential hypertension       metoprolol 25 MG tablet    LOPRESSOR    180 tablet    Take 1 tablet (25 mg) by mouth 2 times daily    NSTEMI (non-ST elevated myocardial infarction) (H)       nitroGLYcerin 0.4 MG sublingual tablet    NITROSTAT    25 tablet    Place 1 tablet (0.4 mg) under the tongue every 5 minutes as needed for chest pain    NSTEMI (non-ST elevated myocardial infarction) (H)       pantoprazole 40 MG EC tablet    PROTONIX    90 tablet    Take 1 tablet (40 mg) by mouth daily    Gastroesophageal reflux disease without esophagitis       sertraline 50 MG tablet    ZOLOFT    15 tablet    Take 0.5 tablets (25 mg) by mouth daily    Other mental problems       ZYRTEC 10 MG tablet   Generic drug:  cetirizine      Take 10 mg by mouth daily.

## 2017-07-21 ENCOUNTER — ALLIED HEALTH/NURSE VISIT (OUTPATIENT)
Dept: NURSING | Facility: CLINIC | Age: 57
End: 2017-07-21

## 2017-07-21 VITALS — SYSTOLIC BLOOD PRESSURE: 113 MMHG | HEART RATE: 72 BPM | DIASTOLIC BLOOD PRESSURE: 75 MMHG

## 2017-07-21 DIAGNOSIS — Z01.30 BP CHECK: Primary | ICD-10-CM

## 2017-07-21 DIAGNOSIS — E78.2 MIXED HYPERLIPIDEMIA: ICD-10-CM

## 2017-07-21 DIAGNOSIS — I10 ESSENTIAL HYPERTENSION: ICD-10-CM

## 2017-07-21 DIAGNOSIS — I25.10 CORONARY ARTERY DISEASE INVOLVING NATIVE CORONARY ARTERY OF NATIVE HEART WITHOUT ANGINA PECTORIS: ICD-10-CM

## 2017-07-21 DIAGNOSIS — I25.5 ISCHEMIC CARDIOMYOPATHY: ICD-10-CM

## 2017-07-21 LAB
ANION GAP SERPL CALCULATED.3IONS-SCNC: 8 MMOL/L (ref 3–14)
BUN SERPL-MCNC: 30 MG/DL (ref 7–30)
CALCIUM SERPL-MCNC: 9.9 MG/DL (ref 8.5–10.1)
CHLORIDE SERPL-SCNC: 102 MMOL/L (ref 94–109)
CO2 SERPL-SCNC: 25 MMOL/L (ref 20–32)
CREAT SERPL-MCNC: 1.07 MG/DL (ref 0.52–1.04)
GFR SERPL CREATININE-BSD FRML MDRD: 53 ML/MIN/1.7M2
GLUCOSE SERPL-MCNC: 129 MG/DL (ref 70–99)
POTASSIUM SERPL-SCNC: 4 MMOL/L (ref 3.4–5.3)
SODIUM SERPL-SCNC: 135 MMOL/L (ref 133–144)

## 2017-07-21 PROCEDURE — 36415 COLL VENOUS BLD VENIPUNCTURE: CPT | Performed by: NURSE PRACTITIONER

## 2017-07-21 PROCEDURE — 80048 BASIC METABOLIC PNL TOTAL CA: CPT | Performed by: NURSE PRACTITIONER

## 2017-07-21 NOTE — MR AVS SNAPSHOT
"              After Visit Summary   7/21/2017    Tania Azevedo    MRN: [id number]           Patient Information     Date Of Birth          1960        Visit Information        Provider Department      7/21/2017 3:15 PM [facility] [place] [facility]        Today's Diagnoses     BP check    -  1       Follow-ups after your visit        Your next 10 appointments already scheduled     Sep 26, 2017  3:15 PM CDT   Return Visit with Dominick Huffman MD   [facility] [place] ([facility])    [address] [place] [address] [phone]              Who to contact     If you have questions or need follow up information about today's clinic visit or your schedule please contact [facility] directly at [phone].  Normal or non-critical lab and imaging results will be communicated to you by MyChart, letter or phone within 4 business days after the clinic has received the results. If you do not hear from us within 7 days, please contact the clinic through MyChart or phone. If you have a critical or abnormal lab result, we will notify you by phone as soon as possible.  Submit refill requests through [employer] or call your pharmacy and they will forward the refill request to us. Please allow 3 business days for your refill to be completed.          Additional Information About Your Visit        MyChart Information     [employer] lets you send messages to your doctor, view your test results, renew your prescriptions, schedule appointments and more. To sign up, go to [email or web address] . Click on \"Log in\" on the left side of the screen, which will take you to the Welcome page. Then click on \"Sign up Now\" on the right side of the page.     You will be asked to enter the access code listed below, as well as some personal information. Please follow the directions to create your username and password.     Your access code is: " [email or web address]  Expires: 10/8/2017  4:10 PM     Your access code will  in 90 days. If you need help or a new code, please call your [facility] clinic or [phone].        Care EveryWhere ID     This is your Care EveryWhere ID. This could be used by other organizations to access your [facility] medical records  [id number]        Your Vitals Were     Pulse                   72            Blood Pressure from Last 3 Encounters:   17 113/75   07/10/17 146/90   17 160/80    Weight from Last 3 Encounters:   07/10/17 210 lb (95.3 kg)   17 214 lb 6.4 oz (97.3 kg)   16 217 lb (98.4 kg)              Today, you had the following     No orders found for display         Today's Medication Changes          These changes are accurate as of: 17  3:21 PM.  If you have any questions, ask your nurse or doctor.               These medicines have changed or have updated prescriptions.        Dose/Directions    FLONASE 50 MCG/DOSE nasal spray   This may have changed:  See the new instructions.   Used for:  Other mental problems        INHALE 2 SPRAYS IN EACH NOSTRIL ONCE DAILY   Quantity:  3 MONTHS   Refills:  1 YEAR                Primary Care Provider Office Phone # Fax #    Swathi CLARISA Ugarte [phone]       [facility] [address] [place] [address]        Equal Access to Services     [facility] Hospital[facility]ANNETTE AH: Hadii aad ku hadasho Soomaali, waaxda luqadaha, qaybta kaalmada adeegyada, carlyle dobson . So [facility] [phone].    ATENCIÓN: Si habla español, tiene a ahuja disposición servicios gratuitos de asistencia lingüística. Llame al [phone].    We comply with applicable federal civil rights laws and [place] laws. We do not discriminate on the basis of race, color, national origin, age, disability sex, sexual orientation or gender identity.            Thank you!     Thank you for choosing [facility]  for your care. Our goal is  always to provide you with excellent care. Hearing back from our patients is one way we can continue to improve our services. Please take a few minutes to complete the written survey that you may receive in the mail after your visit with us. Thank you!             Your Updated Medication List - Protect others around you: Learn how to safely use, store and throw away your medicines at www.disposemymeds.org.          This list is accurate as of: 7/21/17  3:21 PM.  Always use your most recent med list.                   Brand Name Dispense Instructions for use Diagnosis    albuterol 108 (90 BASE) MCG/ACT Inhaler    PROAIR HFA/PROVENTIL HFA/VENTOLIN HFA    1 Inhaler    Inhale 2 puffs into the lungs every 6 hours.    Bronchitis with bronchospasm       aspirin 81 MG chewable tablet     30 tablet    Take 1 tablet (81 mg) by mouth daily    NSTEMI (non-ST elevated myocardial infarction) (H)       atorvastatin 40 MG tablet    LIPITOR    90 tablet    Take 1 tablet (40 mg) by mouth daily    NSTEMI (non-ST elevated myocardial infarction) (H)       AZITHROMYCIN PO      Take 250 mg by mouth Last dose 7/9/17        benzonatate 100 MG capsule    TESSALON    20 capsule    Take 1 capsule by mouth 3 times daily as needed for cough.    Bronchitis with bronchospasm       FLONASE 50 MCG/DOSE nasal spray     3 MONTHS    INHALE 2 SPRAYS IN EACH NOSTRIL ONCE DAILY    Other mental problems       FLUCONAZOLE PO      Take 100 mg by mouth 2 tablets / as directed        hydrochlorothiazide 25 MG tablet    HYDRODIURIL    45 tablet    Take 0.5 tablets (12.5 mg) by mouth daily    Essential hypertension       irbesartan 300 MG tablet    AVAPRO    45 tablet    Take 0.5 tablets (150 mg) by mouth daily    Benign essential hypertension       metoprolol 25 MG tablet    LOPRESSOR    180 tablet    Take 1 tablet (25 mg) by mouth 2 times daily    NSTEMI (non-ST elevated myocardial infarction) (H)       nitroGLYcerin 0.4 MG sublingual tablet    NITROSTAT    25  tablet    Place 1 tablet (0.4 mg) under the tongue every 5 minutes as needed for chest pain    NSTEMI (non-ST elevated myocardial infarction) (H)       pantoprazole 40 MG EC tablet    PROTONIX    90 tablet    Take 1 tablet (40 mg) by mouth daily    Gastroesophageal reflux disease without esophagitis       sertraline 50 MG tablet    ZOLOFT    15 tablet    Take 0.5 tablets (25 mg) by mouth daily    Other mental problems       ZYRTEC 10 MG tablet   Generic drug:  cetirizine      Take 10 mg by mouth daily.

## 2017-07-21 NOTE — PROGRESS NOTES
Tania Azevedo is a 56 year old female who comes in today for a Blood Pressure check because of new medication and ongoing blood pressure monitoring.    *Document pulse and BP  *Use new set of vitals button for multiple readings.  *Use extended vitals for orthostatic    Vitals as recorded, a large cuff was used.    Patient is taking medication as prescribed  Patient is tolerating medications well.  Patient is monitoring Blood Pressure at home.  Average readings if yes are 124/79    Current complaints: leg cramping    Disposition: results routed to MD/AP        /75  Pulse 72    This is noted.  Gabriela Reich APRN

## 2017-07-24 ENCOUNTER — CARE COORDINATION (OUTPATIENT)
Dept: CARDIOLOGY | Facility: CLINIC | Age: 57
End: 2017-07-24

## 2017-07-24 DIAGNOSIS — I10 BENIGN ESSENTIAL HYPERTENSION: Primary | ICD-10-CM

## 2017-07-24 NOTE — PROGRESS NOTES
"Call placed to patient to review recent lab and recommendations from Gabriela Reich NP.      Per Penny, \"  Gabriela Reich, APRN CNP  P [facility] 5                     Blood pressure looks improved with the HCTZ. Her BUN and Cr are sl elevated, Lets repeat blood work in 6 weeks. Thank      \"    No answer, left detailed VM on identified VM. Order placed for F/U BMP in 6 weeks.   "

## 2017-08-10 ENCOUNTER — OFFICE VISIT (OUTPATIENT)
Dept: URGENT CARE | Facility: URGENT CARE | Age: 57
End: 2017-08-10
Payer: COMMERCIAL

## 2017-08-10 VITALS
SYSTOLIC BLOOD PRESSURE: 126 MMHG | WEIGHT: 208 LBS | TEMPERATURE: 97 F | RESPIRATION RATE: 15 BRPM | OXYGEN SATURATION: 97 % | DIASTOLIC BLOOD PRESSURE: 82 MMHG | HEART RATE: 80 BPM | BODY MASS INDEX: 39.3 KG/M2

## 2017-08-10 DIAGNOSIS — J01.90 ACUTE SINUSITIS WITH SYMPTOMS > 10 DAYS: Primary | ICD-10-CM

## 2017-08-10 DIAGNOSIS — M26.609 TMJ (TEMPOROMANDIBULAR JOINT SYNDROME): ICD-10-CM

## 2017-08-10 DIAGNOSIS — H92.01 EAR PAIN, RIGHT: ICD-10-CM

## 2017-08-10 PROCEDURE — 99213 OFFICE O/P EST LOW 20 MIN: CPT | Performed by: FAMILY MEDICINE

## 2017-08-10 NOTE — LETTER
[facility]  [address] [place] [facility] [address]  Phone: [phone]    August 10, 2017        Tania Azevedo  [address] [facility] [address]          To whom it may concern:    RE: Tania Azevedo    Patient was seen and treated today at our clinic.  Please excuse tomorrow.     Please contact me for questions or concerns.      Sincerely,        Yanely Reyes MD

## 2017-08-10 NOTE — PROGRESS NOTES
SUBJECTIVE:                                                    Tania Azevedo is a 56 year old female who presents to clinic today for the following health issues:      RESPIRATORY SYMPTOMS      Duration: 2 weeks but ear pain today    Description  nasal congestion, rhinorrhea, facial pain/pressure, ear pain right and post nasal drainage    Severity: moderate    Accompanying signs and symptoms: None    History (predisposing factors):  asthma    Precipitating or alleviating factors: None    Therapies tried and outcome:  Acetaminophen helps a little bit.       A month ago had pain in the left jaw.   Went to the dentist and the dentist said nothing going on. Thought might have been sinus  Was prescribed with zithromax  Patient went back in 2 weeks and pain went away    Last week has had some off and runny nose   But when patient woke up with pain now in the right ear pressure  Right eye hurts  Patient went to take a drink and throat hurt when drinking.     Occasional sharp pains in right jaw and right ear pain today   Colds, sinus congestion, facial pain  Cough Yes  Greenish discharge  Fever No  Progressively getting worse: YES  Thought was getting better then started getting worse: YES  Getting better:  No  Exposure to pertussis or pertussis like symptoms: No    Problem list and histories reviewed & adjusted, as indicated.  Additional history: as documented    Problem list, Medication list, Allergies, and Medical/Social/Surgical histories reviewed in [facility] and updated as appropriate.    ROS:  Constitutional, HEENT, cardiovascular, pulmonary, gi and gu systems are negative, except as otherwise noted.    OBJECTIVE:                                                    /82  Pulse 80  Temp 97  F (36.1  C) (Oral)  Resp 15  Wt 208 lb (94.3 kg)  SpO2 97%  Breastfeeding? No  BMI 39.3 kg/m2  Body mass index is 39.3 kg/(m^2).  GENERAL: healthy, alert and no distress  EYES: Eyes grossly normal to inspection, PERRL and  conjunctivae and sclerae normal  HENT: ear canals and TM's normal, nose and mouth without ulcers or lesions  Sinuses: turbinates erythematous maxillary sinus tenderness frontal sinus tenderness   POSITIVE TENDERNESS RIGHT TMJ AREA   NECK: no adenopathy, no asymmetry, masses, or scars and thyroid normal to palpation  RESP: lungs clear to auscultation - no rales, rhonchi or wheezes   CV: regular rate and rhythm, normal S1 S2, no S3 or S4, no murmur, click or rub, no peripheral edema and peripheral pulses strong  ABDOMEN: soft, nontender, no hepatosplenomegaly, no masses and bowel sounds normal  MS: no gross musculoskeletal defects noted, no edema  SKIN: no suspicious lesions or rashes  NEURO: Normal strength and tone, mentation intact and speech normal  PSYCH: mentation appears normal, affect normal/bright    Diagnostic Test Results:  No results found for this or any previous visit (from the past 24 hour(s)).     ASSESSMENT/PLAN:                                                        ICD-10-CM    1. Acute sinusitis with symptoms > 10 days J01.90 amoxicillin-clavulanate (AUGMENTIN) 875-125 MG per tablet   2. Ear pain, right H92.01    3. TMJ (temporomandibular joint syndrome) M26.609          Prescribed with augmentin  Side effects discussed warned about GI side effects and risk of cdiff.    Patient also with TMJ pain. Supportive treatment  And follow up with dentist if persist    Other differentials of unilateral headache discussed. Offered labs, (cbc, crp esr) patient declined. Clinical suspicion based on presentation is less likely.  However if symptoms persist further evaluation warranted, consider imaging  Alarm signs or symptoms discussed, if present recommend go to ER     Recommend follow up with primary care provider if no relief in 3 days, sooner if worse  Adverse reactions of medications discussed.  Over the counter medications discussed.   Aware to come back in if with worsening symptoms or if no relief  despite treatment plan  Patient voiced understanding and had no further questions.     MD Yanely Ahumada MD  [facility]

## 2017-08-10 NOTE — NURSING NOTE
"Chief Complaint   Patient presents with     Otalgia     right ear pain        Initial /82  Pulse 80  Temp 97  F (36.1  C) (Oral)  Resp 15  Wt 208 lb (94.3 kg)  SpO2 97%  Breastfeeding? No  BMI 39.3 kg/m2 Estimated body mass index is 39.3 kg/(m^2) as calculated from the following:    Height as of 7/10/17: 5' 1\" (1.549 m).    Weight as of this encounter: 208 lb (94.3 kg).  Medication Reconciliation: complete   Georgia Martins MA      "

## 2017-08-10 NOTE — MR AVS SNAPSHOT
After Visit Summary   8/10/2017    Tania Azevedo    MRN: [id number]           Patient Information     Date Of Birth          1960        Visit Information        Provider Department      8/10/2017 6:35 PM Yanely Reyes MD [facility]        Today's Diagnoses     Acute sinusitis with symptoms > 10 days    -  1    Ear pain, right        TMJ (temporomandibular joint syndrome)           Follow-ups after your visit        Your next 10 appointments already scheduled     Sep 01, 2017  3:00 PM CDT   [facility] with [facility]   [facility]    [address]  [place] [id number] [phone]           Patient must bring picture ID. Patient should be prepared to give a urine specimen  Please do not eat 10-12 hours before your appointment if you are coming in fasting for labs on lipids, cholesterol, or glucose (sugar). Pregnant women should follow their Care Team instructions. Water with medications is okay. Do not drink coffee or other fluids. If you have concerns about taking  your medications, please ask at office or if scheduling via Inst[employer], send a message by clicking on Secure Messaging, Message Your Care Team.            Sep 01, 2017  3:15 PM CDT   Nurse Only with BE ANCILLARY   [facility] [place] ([facility])    [address]  [place] [id number] [phone]            Sep 26, 2017  3:15 PM CDT   Return Visit with Dominick Huffman MD   [facility] PHYSICIANS [facility] [place] ([facility])    [address] [place] [id number] [phone]              Who to contact     If you have questions or need follow up information about today's clinic visit or your schedule please contact [facility] directly at [phone].  Normal or non-critical lab and imaging results will be communicated to you by MyChart, letter or phone within 4 business days after  "the clinic has received the results. If you do not hear from us within 7 days, please contact the clinic through [employer] or phone. If you have a critical or abnormal lab result, we will notify you by phone as soon as possible.  Submit refill requests through [employer] or call your pharmacy and they will forward the refill request to us. Please allow 3 business days for your refill to be completed.          Additional Information About Your Visit        [employer] Information     [employer] lets you send messages to your doctor, view your test results, renew your prescriptions, schedule appointments and more. To sign up, go to [email or web address] . Click on \"Log in\" on the left side of the screen, which will take you to the Welcome page. Then click on \"Sign up Now\" on the right side of the page.     You will be asked to enter the access code listed below, as well as some personal information. Please follow the directions to create your username and password.     Your access code is: [email or web address]  Expires: 10/8/2017  4:10 PM     Your access code will  in 90 days. If you need help or a new code, please call your [facility] clinic or [phone].        Care EveryWhere ID     This is your Care EveryWhere ID. This could be used by other organizations to access your [facility] medical records  [id number]        Your Vitals Were     Pulse Temperature Respirations Pulse Oximetry Breastfeeding? BMI (Body Mass Index)    80 97  F (36.1  C) (Oral) 15 97% No 39.3 kg/m2       Blood Pressure from Last 3 Encounters:   08/10/17 126/82   17 113/75   07/10/17 146/90    Weight from Last 3 Encounters:   08/10/17 208 lb (94.3 kg)   07/10/17 210 lb (95.3 kg)   17 214 lb 6.4 oz (97.3 kg)              Today, you had the following     No orders found for display         Today's Medication Changes          These changes are accurate as of: 8/10/17 10:47 PM.  If you have any questions, ask your nurse or doctor.               Start " taking these medicines.        Dose/Directions    amoxicillin-clavulanate 875-125 MG per tablet   Commonly known as:  AUGMENTIN   Used for:  Acute sinusitis with symptoms > 10 days   Started by:  Yanely Reyes MD        Dose:  1 tablet   Take 1 tablet by mouth 2 times daily for 10 days   Quantity:  20 tablet   Refills:  0         These medicines have changed or have updated prescriptions.        Dose/Directions    FLONASE 50 MCG/DOSE nasal spray   This may have changed:  See the new instructions.   Used for:  Other mental problems        INHALE 2 SPRAYS IN EACH NOSTRIL ONCE DAILY   Quantity:  3 MONTHS   Refills:  1 YEAR            Where to get your medicines      These medications were sent to [id number] - [place], [place] - [address]  [address] [facility] [address] [address]     Phone:  [phone]     amoxicillin-clavulanate 875-125 MG per tablet                Primary Care Provider Office Phone # Fax #    Swathi MCKENNA DO Shanel [phone]       [facility] [address] [place] [address]        Equal Access to Services     [facility]: Hadii aad ku hadasho Soomaali, waaxda luqadaha, qaybta kaalmada adeegyada, carlyle dobson . So [facility] [phone].    ATENCIÓN: Si habla español, tiene a ahuja disposición servicios gratuitos de asistencia lingüística. [facility] [phone].    We comply with applicable federal civil rights laws and [place] laws. We do not discriminate on the basis of race, color, national origin, age, disability sex, sexual orientation or gender identity.            Thank you!     Thank you for choosing [facility]  [facility] your [facility]. Our goal is always to provide you with excellent care. Hearing back from our patients is one way we can continue to improve our services. Please take a few minutes to complete the written survey that you may receive in the mail after your visit with us. Thank you!              Your Updated Medication List - Protect others around you: Learn how to safely use, store and throw away your medicines at www.disposemymeds.org.          This list is accurate as of: 8/10/17 10:47 PM.  Always use your most recent med list.                   Brand Name Dispense Instructions for use Diagnosis    albuterol 108 (90 BASE) MCG/ACT Inhaler    PROAIR HFA/PROVENTIL HFA/VENTOLIN HFA    1 Inhaler    Inhale 2 puffs into the lungs every 6 hours.    Bronchitis with bronchospasm       amoxicillin-clavulanate 875-125 MG per tablet    AUGMENTIN    20 tablet    Take 1 tablet by mouth 2 times daily for 10 days    Acute sinusitis with symptoms > 10 days       aspirin 81 MG chewable tablet     30 tablet    Take 1 tablet (81 mg) by mouth daily    NSTEMI (non-ST elevated myocardial infarction) (H)       atorvastatin 40 MG tablet    LIPITOR    90 tablet    Take 1 tablet (40 mg) by mouth daily    NSTEMI (non-ST elevated myocardial infarction) (H)       FLONASE 50 MCG/DOSE nasal spray     3 MONTHS    INHALE 2 SPRAYS IN EACH NOSTRIL ONCE DAILY    Other mental problems       FLUCONAZOLE PO      Take 100 mg by mouth 2 tablets / as directed        hydrochlorothiazide 25 MG tablet    HYDRODIURIL    45 tablet    Take 0.5 tablets (12.5 mg) by mouth daily    Essential hypertension       irbesartan 300 MG tablet    AVAPRO    45 tablet    Take 0.5 tablets (150 mg) by mouth daily    Benign essential hypertension       metoprolol 25 MG tablet    LOPRESSOR    180 tablet    Take 1 tablet (25 mg) by mouth 2 times daily    NSTEMI (non-ST elevated myocardial infarction) (H)       nitroGLYcerin 0.4 MG sublingual tablet    NITROSTAT    25 tablet    Place 1 tablet (0.4 mg) under the tongue every 5 minutes as needed for chest pain    NSTEMI (non-ST elevated myocardial infarction) (H)       pantoprazole 40 MG EC tablet    PROTONIX    90 tablet    Take 1 tablet (40 mg) by mouth daily    Gastroesophageal reflux disease without esophagitis        sertraline 50 MG tablet    ZOLOFT    15 tablet    Take 0.5 tablets (25 mg) by mouth daily    Other mental problems       ZYRTEC 10 MG tablet   Generic drug:  cetirizine      Take 10 mg by mouth daily.

## 2017-08-28 ENCOUNTER — TELEPHONE (OUTPATIENT)
Dept: CARDIOLOGY | Facility: CLINIC | Age: 57
End: 2017-08-28

## 2017-08-28 DIAGNOSIS — I21.4 NSTEMI (NON-ST ELEVATED MYOCARDIAL INFARCTION) (H): ICD-10-CM

## 2017-08-28 DIAGNOSIS — K21.9 GASTROESOPHAGEAL REFLUX DISEASE WITHOUT ESOPHAGITIS: ICD-10-CM

## 2017-08-28 RX ORDER — PANTOPRAZOLE SODIUM 40 MG/1
40 TABLET, DELAYED RELEASE ORAL DAILY
Qty: 90 TABLET | Refills: 3 | Status: SHIPPED | OUTPATIENT
Start: 2017-08-28 | End: 2020-02-28

## 2017-08-28 NOTE — TELEPHONE ENCOUNTER
Pt called stating that her pharmacy needs a doctor verification for her to get her Protonix. Writer called pharmacy, [facility] in [place] and they stated that the patient needs a PA for Protonix as her insurance will only cover 120 day a year.     Will route PA team.

## 2017-08-30 NOTE — TELEPHONE ENCOUNTER
Spoke to patient to see if she has ever gotten the protonix from her PMD and she stated that she always gets it from Dr. Huffman's office. She stated she spoke with her primary doctor in the past and they wanted to give her 20 mg dose instead of 40 mg. Pt does not have to take 20 mg. Pt would like to continue to try to get PA. Pt stated that she only has 2 days left.     Will route to PA team to check status.

## 2017-08-30 NOTE — TELEPHONE ENCOUNTER
[facility] Prior Authorization Team   Phone: [phone]  Fax: [phone]      PA Initiation    Medication: pantoprazole (PROTONIX) 40 MG EC tablet  Insurance Company: [employer] [place] - Phone [phone] Fax [phone]  Pharmacy Filling the Rx: [id number] - [place], [place] - [address] [address]  Filling Pharmacy Phone: [phone]  Filling Pharmacy Fax:    Start Date: 8/30/2017

## 2017-09-01 ENCOUNTER — ALLIED HEALTH/NURSE VISIT (OUTPATIENT)
Dept: NURSING | Facility: CLINIC | Age: 57
End: 2017-09-01
Payer: COMMERCIAL

## 2017-09-01 VITALS — SYSTOLIC BLOOD PRESSURE: 115 MMHG | DIASTOLIC BLOOD PRESSURE: 77 MMHG | HEART RATE: 78 BPM

## 2017-09-01 DIAGNOSIS — Z01.30 BP CHECK: Primary | ICD-10-CM

## 2017-09-01 DIAGNOSIS — I10 BENIGN ESSENTIAL HYPERTENSION: ICD-10-CM

## 2017-09-01 LAB
ANION GAP SERPL CALCULATED.3IONS-SCNC: 10 MMOL/L (ref 3–14)
BUN SERPL-MCNC: 22 MG/DL (ref 7–30)
CALCIUM SERPL-MCNC: 10 MG/DL (ref 8.5–10.1)
CHLORIDE SERPL-SCNC: 100 MMOL/L (ref 94–109)
CO2 SERPL-SCNC: 26 MMOL/L (ref 20–32)
CREAT SERPL-MCNC: 0.88 MG/DL (ref 0.52–1.04)
GFR SERPL CREATININE-BSD FRML MDRD: 66 ML/MIN/1.7M2
GLUCOSE SERPL-MCNC: 98 MG/DL (ref 70–99)
POTASSIUM SERPL-SCNC: 4 MMOL/L (ref 3.4–5.3)
SODIUM SERPL-SCNC: 136 MMOL/L (ref 133–144)

## 2017-09-01 PROCEDURE — 36415 COLL VENOUS BLD VENIPUNCTURE: CPT | Performed by: NURSE PRACTITIONER

## 2017-09-01 PROCEDURE — 99207 ZZC NO CHARGE NURSE ONLY: CPT

## 2017-09-01 PROCEDURE — 80048 BASIC METABOLIC PNL TOTAL CA: CPT | Performed by: NURSE PRACTITIONER

## 2017-09-01 NOTE — PROGRESS NOTES
Tania Azevedo is a 56 year old female who comes in today for a Blood Pressure check because of new medication and ongoing blood pressure monitoring.    *Document pulse and BP  *Use new set of vitals button for multiple readings.  *Use extended vitals for orthostatic    Vitals as recorded, a large cuff was used.    Patient is taking medication as prescribed  Patient is tolerating medications well.  Patient is monitoring Blood Pressure at home.  Average readings if yes are 117/70    Current complaints: none  Did have some muscle spasms.  Is taking OTC potassium and spasms have resolved    Disposition: results routed to MD/AP        /77  Pulse 78

## 2017-09-01 NOTE — MR AVS SNAPSHOT
"              After Visit Summary   9/1/2017    Tania Azevedo    MRN: [id number]           Patient Information     Date Of Birth          1960        Visit Information        Provider Department      9/1/2017 3:15 PM BE ANCILLARY [facility]        Today's Diagnoses     BP check    -  1       Follow-ups after your visit        Your next 10 appointments already scheduled     Sep 26, 2017  3:15 PM CDT   Return Visit with Dominick Huffman MD   [facility]YSICIANS [facility] AT [place] ([facility])    [address] [place] [id number] [phone]              Who to contact     If you have questions or need follow up information about today's clinic visit or your schedule please contact [facility] directly at [phone].  Normal or non-critical lab and imaging results will be communicated to you by MyChart, letter or phone within 4 business days after the clinic has received the results. If you do not hear from us within 7 days, please contact the clinic through MyChart or phone. If you have a critical or abnormal lab result, we will notify you by phone as soon as possible.  Submit refill requests through [employer] or call your pharmacy and they will forward the refill request to us. Please allow 3 business days for your refill to be completed.          Additional Information About Your Visit        MyChart Information     [employer] lets you send messages to your doctor, view your test results, renew your prescriptions, schedule appointments and more. To sign up, go to [email or web address] . Click on \"Log in\" on the left side of the screen, which will take you to the Welcome page. Then click on \"Sign up Now\" on the right side of the page.     You will be asked to enter the access code listed below, as well as some personal information. Please follow the directions to create your username and password.     Your access code is: " [email or web address]  Expires: 10/8/2017  4:10 PM     Your access code will  in 90 days. If you need help or a new code, please call your [place] clinic or [phone].        Care EveryWhere ID     This is your Care EveryWhere ID. This could be used by other organizations to access your [place] medical records  [id number]        Your Vitals Were     Pulse                   78            Blood Pressure from Last 3 Encounters:   17 115/77   08/10/17 126/82   17 113/75    Weight from Last 3 Encounters:   08/10/17 208 lb (94.3 kg)   07/10/17 210 lb (95.3 kg)   17 214 lb 6.4 oz (97.3 kg)              Today, you had the following     No orders found for display         Today's Medication Changes          These changes are accurate as of: 17  3:30 PM.  If you have any questions, ask your nurse or doctor.               These medicines have changed or have updated prescriptions.        Dose/Directions    FLONASE 50 MCG/DOSE nasal spray   This may have changed:  See the new instructions.   Used for:  Other mental problems        INHALE 2 SPRAYS IN EACH NOSTRIL ONCE DAILY   Quantity:  3 MONTHS   Refills:  1 YEAR                Primary Care Provider Office Phone # Fax #    Swathi CLARISA Ugarte [phone]       [facility] [address] [place] [address]        Equal Access to Services     [facility]: Hadii aad ku hadasho Soomaali, waaxda luqadaha, qaybta kaalmada adeegyada, carlyle dobson . So [facility] [phone].    ATENCIÓN: Si habla español, tiene a ahuja disposición servicios gratuitos de asistencia lingüística. Llame al [phone].    We comply with applicable federal civil rights laws and [place] laws. We do not discriminate on the basis of race, color, national origin, age, disability sex, sexual orientation or gender identity.            Thank you!     Thank you for choosing [facility]  for your care. Our goal is  always to provide you with excellent care. Hearing back from our patients is one way we can continue to improve our services. Please take a few minutes to complete the written survey that you may receive in the mail after your visit with us. Thank you!             Your Updated Medication List - Protect others around you: Learn how to safely use, store and throw away your medicines at www.disposemymeds.org.          This list is accurate as of: 9/1/17  3:30 PM.  Always use your most recent med list.                   Brand Name Dispense Instructions for use Diagnosis    albuterol 108 (90 BASE) MCG/ACT Inhaler    PROAIR HFA/PROVENTIL HFA/VENTOLIN HFA    1 Inhaler    Inhale 2 puffs into the lungs every 6 hours.    Bronchitis with bronchospasm       aspirin 81 MG chewable tablet     30 tablet    Take 1 tablet (81 mg) by mouth daily    NSTEMI (non-ST elevated myocardial infarction) (H)       atorvastatin 40 MG tablet    LIPITOR    90 tablet    Take 1 tablet (40 mg) by mouth daily    NSTEMI (non-ST elevated myocardial infarction) (H)       FLONASE 50 MCG/DOSE nasal spray     3 MONTHS    INHALE 2 SPRAYS IN EACH NOSTRIL ONCE DAILY    Other mental problems       FLUCONAZOLE PO      Take 100 mg by mouth 2 tablets / as directed        hydrochlorothiazide 25 MG tablet    HYDRODIURIL    45 tablet    Take 0.5 tablets (12.5 mg) by mouth daily    Essential hypertension       irbesartan 300 MG tablet    AVAPRO    45 tablet    Take 0.5 tablets (150 mg) by mouth daily    Benign essential hypertension       metoprolol 25 MG tablet    LOPRESSOR    180 tablet    Take 1 tablet (25 mg) by mouth 2 times daily    NSTEMI (non-ST elevated myocardial infarction) (H)       nitroGLYcerin 0.4 MG sublingual tablet    NITROSTAT    25 tablet    Place 1 tablet (0.4 mg) under the tongue every 5 minutes as needed for chest pain    NSTEMI (non-ST elevated myocardial infarction) (H)       pantoprazole 40 MG EC tablet    PROTONIX    90 tablet    Take 1  tablet (40 mg) by mouth daily    Gastroesophageal reflux disease without esophagitis       sertraline 50 MG tablet    ZOLOFT    15 tablet    Take 0.5 tablets (25 mg) by mouth daily    Other mental problems       ZYRTEC 10 MG tablet   Generic drug:  cetirizine      Take 10 mg by mouth daily.

## 2017-09-06 RX ORDER — METOPROLOL TARTRATE 25 MG/1
25 TABLET, FILM COATED ORAL 2 TIMES DAILY
Qty: 180 TABLET | Refills: 3 | Status: SHIPPED | OUTPATIENT
Start: 2017-09-06 | End: 2018-09-06

## 2017-09-06 NOTE — TELEPHONE ENCOUNTER
Patient called asking about the PA for Protonix.  Informed her it is in process however if she is running out she could call her PMD.  Informed her most times Cardiology does not prescribe the Protonix and that she may be able to get it quicker without a PA if her PMD orders is.  She said she is in the process of finding a new PMD and once she does she will have them take over the Protonix.     Also, she requested a metoprolol refill. Sent.

## 2017-09-12 NOTE — TELEPHONE ENCOUNTER
Tried to call patient to let her know the PA for Protonix has been denied. No answer. Left VM for patient informing her of the denial and reason. Writer encouraged patient to go to PMD to discuss if she still needs to be on this medication and/or if they would like patient to try something else as this is not a long term medication. Left team 4 direct number for patient to call with questions or concerns.

## 2017-09-12 NOTE — TELEPHONE ENCOUNTER
PRIOR AUTHORIZATION DENIED    Medication: pantoprazole (PROTONIX) 40 MG EC tablet - Qty Limit Denied    Denial Date: 9/6/2017    Denial Rational: Your provider requested a quantity larger or greater than allowed in your benefit set. In order to approve this medication, program criteria must be met. Your dose must be at or below the highest dose that is suggested by the FDA. You must be taking this drug according to the FDA label. This includes how often you take the drug and your intended condition and length of treatment. You are able to get up to 1 capsule per day for a maximum of 120 days within 365 days.  This is the duration limit set by your plan for PPI's (Proton Pump Inhibitors).  If you feel there is additional information related to this case that might affect this decision and an appeal is desired, please submit a written letter of medical necessity to our PA Team.            Appeal Information:

## 2017-09-25 ENCOUNTER — TELEPHONE (OUTPATIENT)
Dept: CARDIOLOGY | Facility: CLINIC | Age: 57
End: 2017-09-25

## 2017-09-25 NOTE — TELEPHONE ENCOUNTER
Pt called stating she just got to work and realized she forgot to take her morning meds this am. Pt wondering what to do. Pt will be home around 2 pm. Writer informed patient to take all daily meds when she gets home and only 1 dose of metoprolol. Pt agreed to plan. Pt stated that she bought a pill container for her morning meds so if she forgets to take them at home she will have them to take at work.  Writer stated that was a good plan. No further questions or concerns at this time. Pt have OV 9/26/17 with Dr. Huffman

## 2017-09-26 ENCOUNTER — OFFICE VISIT (OUTPATIENT)
Dept: CARDIOLOGY | Facility: CLINIC | Age: 57
End: 2017-09-26
Attending: INTERNAL MEDICINE
Payer: COMMERCIAL

## 2017-09-26 VITALS
DIASTOLIC BLOOD PRESSURE: 65 MMHG | WEIGHT: 209 LBS | SYSTOLIC BLOOD PRESSURE: 105 MMHG | HEIGHT: 61 IN | BODY MASS INDEX: 39.46 KG/M2 | HEART RATE: 64 BPM

## 2017-09-26 DIAGNOSIS — I10 ESSENTIAL HYPERTENSION WITH GOAL BLOOD PRESSURE LESS THAN 140/90: ICD-10-CM

## 2017-09-26 DIAGNOSIS — I25.10 CORONARY ARTERY DISEASE INVOLVING NATIVE CORONARY ARTERY OF NATIVE HEART WITHOUT ANGINA PECTORIS: ICD-10-CM

## 2017-09-26 DIAGNOSIS — I21.4 NSTEMI (NON-ST ELEVATED MYOCARDIAL INFARCTION) (H): ICD-10-CM

## 2017-09-26 DIAGNOSIS — E78.5 DYSLIPIDEMIA: ICD-10-CM

## 2017-09-26 DIAGNOSIS — I25.5 ISCHEMIC CARDIOMYOPATHY: ICD-10-CM

## 2017-09-26 PROCEDURE — 99214 OFFICE O/P EST MOD 30 MIN: CPT | Performed by: INTERNAL MEDICINE

## 2017-09-26 NOTE — PROGRESS NOTES
HPI and Plan:   See dictation:[id number]    Orders Placed This Encounter   Procedures     Basic metabolic panel     Follow-Up with Cardiologist       No orders of the defined types were placed in this encounter.      There are no discontinued medications.      Encounter Diagnoses   Name Primary?     Coronary artery disease involving native coronary artery of native heart without angina pectoris      NSTEMI (non-ST elevated myocardial infarction) (H)      Ischemic cardiomyopathy      Essential hypertension with goal blood pressure less than 140/90      Dyslipidemia        CURRENT MEDICATIONS:  Current Outpatient Prescriptions   Medication Sig Dispense Refill     metoprolol (LOPRESSOR) 25 MG tablet Take 1 tablet (25 mg) by mouth 2 times daily 180 tablet 3     pantoprazole (PROTONIX) 40 MG EC tablet Take 1 tablet (40 mg) by mouth daily 90 tablet 3     FLUCONAZOLE PO Take 100 mg by mouth 2 tablets / as directed       hydrochlorothiazide (HYDRODIURIL) 25 MG tablet Take 0.5 tablets (12.5 mg) by mouth daily 45 tablet 1     irbesartan (AVAPRO) 300 MG tablet Take 0.5 tablets (150 mg) by mouth daily 45 tablet 3     nitroglycerin (NITROSTAT) 0.4 MG sublingual tablet Place 1 tablet (0.4 mg) under the tongue every 5 minutes as needed for chest pain 25 tablet 3     atorvastatin (LIPITOR) 40 MG tablet Take 1 tablet (40 mg) by mouth daily 90 tablet 3     sertraline (ZOLOFT) 50 MG tablet Take 0.5 tablets (25 mg) by mouth daily 15 tablet 1     aspirin 81 MG chewable tablet Take 1 tablet (81 mg) by mouth daily 30 tablet 0     cetirizine (ZYRTEC) 10 MG tablet Take 10 mg by mouth daily.       albuterol (PROVENTIL HFA: VENTOLIN HFA) 108 (90 BASE) MCG/ACT inhaler Inhale 2 puffs into the lungs every 6 hours. 1 Inhaler 2     FLONASE INHA 50 MCG/DOSE NA INHALE 2 SPRAYS IN EACH NOSTRIL ONCE DAILY (Patient taking differently: INHALE 2 SPRAYS IN EACH NOSTRIL ONCE DAILY PRN) 3 MONTHS 1 YEAR       ALLERGIES     Allergies   Allergen Reactions      Codeine        PAST MEDICAL HISTORY:  Past Medical History:   Diagnosis Date     CAD (coronary artery disease)     stent 2/9/15     Depression      Dyslipidemia      Hypertension      NSTEMI (non-ST elevated myocardial infarction) (H) 02-06-15     Tobacco abuse        PAST SURGICAL HISTORY:  Past Surgical History:   Procedure Laterality Date     C ANESTH,RADICAL HYSTERECTOMY       COLECTOMY RIGHT Right 6/3/2016    Procedure: COLECTOMY RIGHT;  Surgeon: Dillan Vaughn MD;  Location: [facility] OR     HEART CATH LEFT HEART CATH  02-09-15    SHE to RCA, Aspiration thrombectomy of RCA, 20-30% proximal LAD narrowing, 30% mid LAD, 40% mid first diagonal, prox RCA was occluded with 30% in distal RCA      HEART CATH, ANGIOPLASTY       LAPAROSCOPIC APPENDECTOMY N/A 6/3/2016    Procedure: LAPAROSCOPIC APPENDECTOMY;  Surgeon: Dillan Vaughn MD;  Location: [facility] OR       FAMILY HISTORY:  Family History   Problem Relation Age of Onset     Unknown/Adopted Mother      Unknown/Adopted Father        SOCIAL HISTORY:  Social History     Social History     Marital status: Single     Spouse name: N/A     Number of children: N/A     Years of education: N/A     Social History Main Topics     Smoking status: Former Smoker     Types: Cigarettes     Smokeless tobacco: Never Used      Comment: 2/6/15 - quit e cig     Alcohol use No     Drug use: No     Sexual activity: Yes     Partners: Male     Birth control/ protection: Surgical     Other Topics Concern     Caffeine Concern Yes     1 can of diet soda and once in a while a coffee      Sleep Concern Yes     not lately     Stress Concern Yes     Significant other - Terminal Cancer     Special Diet No     Exercise Yes     some 2 days per week      Seat Belt Yes     Social History Narrative       Review of Systems:  Skin:  Negative       Eyes:  Positive for glasses    ENT:  Negative      Respiratory:  Negative       Cardiovascular:  Negative      Gastroenterology: Positive for heartburn   "  Genitourinary:  not assessed      Musculoskeletal:  Positive for joint pain    Neurologic:  Negative      Psychiatric:  Negative      Heme/Lymph/Imm:  Negative      Endocrine:  Negative        Physical Exam:  Vitals: /65  Pulse 64  Ht 1.549 m (5' 1\")  Wt 94.8 kg (209 lb)  BMI 39.49 kg/m2    Constitutional:  cooperative, alert and oriented, well developed, well nourished, in no acute distress obese      Skin:  warm and dry to the touch, no apparent skin lesions or masses noted        Head:  normocephalic, no masses or lesions        Eyes:  pupils equal and round, conjunctivae and lids unremarkable, sclera white, no xanthalasma, EOMS intact, no nystagmus        ENT:  no pallor or cyanosis, dentition good        Neck:  carotid pulses are full and equal bilaterally, JVP normal, no carotid bruit, no thyromegaly        Chest:  normal breath sounds, clear to auscultation, normal A-P diameter, normal symmetry, normal respiratory excursion, no use of accessory muscles          Cardiac: regular rhythm;normal S1 and S2;apical impulse not displaced   S4 no presence of murmur       right radial site without hematoma or bruit    Abdomen:  abdomen soft, non-tender, BS normoactive, no mass, no HSM, no bruits        Vascular: pulses full and equal, no bruits auscultated                                        Extremities and Back:  no deformities, clubbing, cyanosis, erythema observed;no edema              Neurological:  affect appropriate, oriented to time, person and place;no gross motor deficits              CC  Dominick Huffman MD  [address]  [place] [phone]              "

## 2017-09-26 NOTE — MR AVS SNAPSHOT
After Visit Summary   9/26/2017    Tania Azevedo    MRN: [id number]           Patient Information     Date Of Birth          1960        Visit Information        Provider Department      9/26/2017 3:15 PM Dominick Huffman MD [facility]        Today's Diagnoses     Coronary artery disease involving native coronary artery of native heart without angina pectoris        NSTEMI (non-ST elevated myocardial infarction) (H)        Ischemic cardiomyopathy        Essential hypertension with goal blood pressure less than 140/90        Dyslipidemia           Follow-ups after your visit        Additional Services     Follow-Up with Cardiologist                 Future tests that were ordered for you today     Open Future Orders        Priority Expected Expires Ordered    Follow-Up with Cardiologist Routine 9/26/2018 9/27/2018 9/26/2017    Basic metabolic panel Routine 9/26/2018 9/27/2018 9/26/2017            Who to contact     If you have questions or need follow up information about today's clinic visit or your schedule please contact [facility] directly at [phone].  Normal or non-critical lab and imaging results will be communicated to you by Woisiohart, letter or phone within 4 business days after the clinic has received the results. If you do not hear from us within 7 days, please contact the clinic through Nexavist or phone. If you have a critical or abnormal lab result, we will notify you by phone as soon as possible.  Submit refill requests through RotoPop or call your pharmacy and they will forward the refill request to us. Please allow 3 business days for your refill to be completed.          Additional Information About Your Visit        Woisiohart Information     RotoPop lets you send messages to your doctor, view your test results, renew your prescriptions, schedule appointments and more. To sign up, go to  [email or web address] . Click on \"Log in\" on the left side of the screen, which will take you to the Welcome page. Then click on \"Sign up Now\" on the right side of the page.     You will be asked to enter the access code listed below, as well as some personal information. Please follow the directions to create your username and password.     Your access code is: [email or web address]  Expires: 10/8/2017  4:10 PM     Your access code will  in 90 days. If you need help or a new code, please call your [place] clinic or [phone].        Care EveryWhere ID     This is your Care EveryWhere ID. This could be used by other organizations to access your [place] medical records  [id number]        Your Vitals Were     Pulse Height BMI (Body Mass Index)             64 1.549 m (5' 1\") 39.49 kg/m2          Blood Pressure from Last 3 Encounters:   17 105/65   17 115/77   08/10/17 126/82    Weight from Last 3 Encounters:   17 94.8 kg (209 lb)   08/10/17 94.3 kg (208 lb)   07/10/17 95.3 kg (210 lb)              We Performed the Following     Follow-Up with Cardiologist          Today's Medication Changes          These changes are accurate as of: 17  3:47 PM.  If you have any questions, ask your nurse or doctor.               These medicines have changed or have updated prescriptions.        Dose/Directions    FLONASE 50 MCG/DOSE nasal spray   This may have changed:  See the new instructions.   Used for:  Other mental problems        INHALE 2 SPRAYS IN EACH NOSTRIL ONCE DAILY   Quantity:  3 MONTHS   Refills:  1 YEAR                Primary Care Provider Office Phone # Fax #    Swathi Ugarte [phone]       [facility] [address] [place] [address]        Equal Access to Services     [facility]: Ofelia Lima, franci vences, qahalima martinalcarlyle brown . [facility] [phone].    ATENCIÓN: Si eddie " español, tiene a ahuja disposición servicios gratuitos de asistencia lingüística. Kareem saleh [phone].    We comply with applicable federal civil rights laws and [place] laws. We do not discriminate on the basis of race, color, national origin, age, disability sex, sexual orientation or gender identity.            Thank you!     Thank you for choosing [facility] [place]  for your care. Our goal is always to provide you with excellent care. Hearing back from our patients is one way we can continue to improve our services. Please take a few minutes to complete the written survey that you may receive in the mail after your visit with us. Thank you!             Your Updated Medication List - Protect others around you: Learn how to safely use, store and throw away your medicines at www.disposemymeds.org.          This list is accurate as of: 9/26/17  3:47 PM.  Always use your most recent med list.                   Brand Name Dispense Instructions for use Diagnosis    albuterol 108 (90 BASE) MCG/ACT Inhaler    PROAIR HFA/PROVENTIL HFA/VENTOLIN HFA    1 Inhaler    Inhale 2 puffs into the lungs every 6 hours.    Bronchitis with bronchospasm       aspirin 81 MG chewable tablet     30 tablet    Take 1 tablet (81 mg) by mouth daily    NSTEMI (non-ST elevated myocardial infarction) (H)       atorvastatin 40 MG tablet    LIPITOR    90 tablet    Take 1 tablet (40 mg) by mouth daily    NSTEMI (non-ST elevated myocardial infarction) (H)       FLONASE 50 MCG/DOSE nasal spray     3 MONTHS    INHALE 2 SPRAYS IN EACH NOSTRIL ONCE DAILY    Other mental problems       FLUCONAZOLE PO      Take 100 mg by mouth 2 tablets / as directed        hydrochlorothiazide 25 MG tablet    HYDRODIURIL    45 tablet    Take 0.5 tablets (12.5 mg) by mouth daily    Essential hypertension       irbesartan 300 MG tablet    AVAPRO    45 tablet    Take 0.5 tablets (150 mg) by mouth daily    Benign essential hypertension        metoprolol 25 MG tablet    LOPRESSOR    180 tablet    Take 1 tablet (25 mg) by mouth 2 times daily    NSTEMI (non-ST elevated myocardial infarction) (H)       nitroGLYcerin 0.4 MG sublingual tablet    NITROSTAT    25 tablet    Place 1 tablet (0.4 mg) under the tongue every 5 minutes as needed for chest pain    NSTEMI (non-ST elevated myocardial infarction) (H)       pantoprazole 40 MG EC tablet    PROTONIX    90 tablet    Take 1 tablet (40 mg) by mouth daily    Gastroesophageal reflux disease without esophagitis       sertraline 50 MG tablet    ZOLOFT    15 tablet    Take 0.5 tablets (25 mg) by mouth daily    Other mental problems       ZYRTEC 10 MG tablet   Generic drug:  cetirizine      Take 10 mg by mouth daily.

## 2017-09-26 NOTE — LETTER
9/26/2017    Swathi Ugarte, DO  [employer] [facility]   [address]  [facility] [address]    RE: Tania Azevedo       Dear Colleague,    PRIMARY CARE PHYSICIAN:  Swathi Ugarte MD      I again had the pleasure of seeing your patient, Tania Azevedo, at [facility] for evaluation of coronary artery disease and hypertension.  The patient drives a minivan for work.  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  We found a total occlusion of the right coronary artery with mild to moderate disease in her other vessels.  Intracoronary stenting and thrombectomy was performed on the right coronary artery.  She has been free of angina.  She has moved to [place] to live with her daughter.  Her exercise routine has been disrupted and she does not exercise at all.  She remains quite busy on the job.  She denies significant dyspnea on exertion, chest discomfort, palpitations, syncope or presyncope.  She was a long-term smoker and has not returned to smoking since her heart attack.  She continues to have difficulty losing weight because of excess calories and portion sizes.  She denies myalgias or myopathy.  Hydrochlorothiazide has been added for hypertension.  Fasting lipid profile 03/27/2017 showed total cholesterol 140, HDL 50, triglycerides 236 and LDL 43.  BMP on 09/01/2017 shows sodium 136, potassium 4.0, BUN 22, creatinine 0.88 and glucose of 98.  The patient asked me to fill her prescription for pantoprazole.  I have referred her back to her primary care physician.  She notes that she is up frequently with nocturia.  She does not know if she snores.  She uses salt on her food.  I have suggested that she attempt to lower the salt in her diet.  She denies peripheral edema.      PHYSICAL EXAMINATION:  As below.     Outpatient Encounter Prescriptions as of 9/26/2017   Medication Sig Dispense Refill     metoprolol (LOPRESSOR) 25 MG tablet Take 1  tablet (25 mg) by mouth 2 times daily 180 tablet 3     pantoprazole (PROTONIX) 40 MG EC tablet Take 1 tablet (40 mg) by mouth daily 90 tablet 3     FLUCONAZOLE PO Take 100 mg by mouth 2 tablets / as directed       hydrochlorothiazide (HYDRODIURIL) 25 MG tablet Take 0.5 tablets (12.5 mg) by mouth daily 45 tablet 1     irbesartan (AVAPRO) 300 MG tablet Take 0.5 tablets (150 mg) by mouth daily 45 tablet 3     nitroglycerin (NITROSTAT) 0.4 MG sublingual tablet Place 1 tablet (0.4 mg) under the tongue every 5 minutes as needed for chest pain 25 tablet 3     atorvastatin (LIPITOR) 40 MG tablet Take 1 tablet (40 mg) by mouth daily 90 tablet 3     sertraline (ZOLOFT) 50 MG tablet Take 0.5 tablets (25 mg) by mouth daily 15 tablet 1     aspirin 81 MG chewable tablet Take 1 tablet (81 mg) by mouth daily 30 tablet 0     cetirizine (ZYRTEC) 10 MG tablet Take 10 mg by mouth daily.       albuterol (PROVENTIL HFA: VENTOLIN HFA) 108 (90 BASE) MCG/ACT inhaler Inhale 2 puffs into the lungs every 6 hours. 1 Inhaler 2     FLONASE INHA 50 MCG/DOSE NA INHALE 2 SPRAYS IN EACH NOSTRIL ONCE DAILY (Patient taking differently: INHALE 2 SPRAYS IN EACH NOSTRIL ONCE DAILY PRN) 3 MONTHS 1 YEAR     No facility-administered encounter medications on file as of 9/26/2017.       ASSESSMENT:   1.  Tania Azevedo is a delightful 56-year-old female status post inferior wall myocardial infarction in 02/2015 with an ejection fraction initially of 40%-45%.  Stress echocardiogram 02/26/2016 demonstrated no ongoing ischemia with mild inferior wall hypokinesis not changing with exercise.  Her ejection fraction was 55%-60%.  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  I have asked her to begin an exercise program of aerobic exercise 3-4 times a week.  We will consider a stress echocardiogram in 3 more years.   2.  Morbid obesity.  We discussed the need for weight loss and exercise.   3.  Systolic hypertension currently under excellent control  on her current medications.   4.  Possible sleep apnea.  The patient is refusing any sleep study for the time being but will call me if she wishes to schedule this in the future.      It is my pleasure to assist in the care of Tania Azevedo.  I will plan on seeing her again in 1 year with a BMP at that time.  All her questions were answered to her satisfaction.      Sincerely,    Dominick Huffman MD     [facility]

## 2017-09-27 NOTE — PROGRESS NOTES
PRIMARY CARE PHYSICIAN:  Swathi Ugarte MD      HISTORY OF PRESENT ILLNESS:  I again had the pleasure of seeing your patient, Tania Azevedo, at [facility] for evaluation of coronary artery disease and hypertension.  The patient drives a minivan for work.  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  We found a total occlusion of the right coronary artery with mild to moderate disease in her other vessels.  Intracoronary stenting and thrombectomy was performed on the right coronary artery.  She has been free of angina.  She has moved to [place] to live with her daughter.  Her exercise routine has been disrupted and she does not exercise at all.  She remains quite busy on the job.  She denies significant dyspnea on exertion, chest discomfort, palpitations, syncope or presyncope.  She was a long-term smoker and has not returned to smoking since her heart attack.  She continues to have difficulty losing weight because of excess calories and portion sizes.  She denies myalgias or myopathy.  Hydrochlorothiazide has been added for hypertension.  Fasting lipid profile 03/27/2017 showed total cholesterol 140, HDL 50, triglycerides 236 and LDL 43.  BMP on 09/01/2017 shows sodium 136, potassium 4.0, BUN 22, creatinine 0.88 and glucose of 98.  The patient asked me to fill her prescription for pantoprazole.  I have referred her back to her primary care physician.  She notes that she is up frequently with nocturia.  She does not know if she snores.  She uses salt on her food.  I have suggested that she attempt to lower the salt in her diet.  She denies peripheral edema.      PHYSICAL EXAMINATION:  As below.      ASSESSMENT:   1.  Tania Azevedo is a delightful 56-year-old female status post inferior wall myocardial infarction in 02/2015 with an ejection fraction initially of 40%-45%.  Stress echocardiogram 02/26/2016 demonstrated no ongoing ischemia with mild  inferior wall hypokinesis not changing with exercise.  Her ejection fraction was 55%-60%.  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  I have asked her to begin an exercise program of aerobic exercise 3-4 times a week.  We will consider a stress echocardiogram in 3 more years.   2.  Morbid obesity.  We discussed the need for weight loss and exercise.   3.  Systolic hypertension currently under excellent control on her current medications.   4.  Possible sleep apnea.  The patient is refusing any sleep study for the time being but will call me if she wishes to schedule this in the future.      It is my pleasure to assist in the care of Tania Araujo.  I will plan on seeing her again in 1 year with a BMP at that time.  All her questions were answered to her satisfaction.      cc:   Swathi Ugarte, DO [facility] [place] [address] [place] [address]         CYNDI LAWSON MD, [facility]             D: 2017 15:56   T: 2017 22:47   MT: KIM      Name:     TANIA ARAUJO   MRN:      [id number]        Account:      [id number]   :      1960           Service Date: 2017      Document: [id number]

## 2018-01-05 DIAGNOSIS — I10 ESSENTIAL HYPERTENSION: ICD-10-CM

## 2018-01-05 RX ORDER — HYDROCHLOROTHIAZIDE 25 MG/1
12.5 TABLET ORAL DAILY
Qty: 45 TABLET | Refills: 3 | Status: SHIPPED | OUTPATIENT
Start: 2018-01-05 | End: 2018-12-31

## 2018-01-15 ENCOUNTER — TELEPHONE (OUTPATIENT)
Dept: CARDIOLOGY | Facility: CLINIC | Age: 58
End: 2018-01-15

## 2018-01-15 NOTE — TELEPHONE ENCOUNTER
Central Prior Authorization Team   Phone: [phone]      PA Initiation    Medication: irbesartan (AVAPRO) 300 MG tablet -pa initaited   Insurance Company:  [place] Medicaid Prior Authorization Form (CB)  Pharmacy Filling the Rx: [id number] - [place], [place] - [address] [address]  Filling Pharmacy Phone: [phone]  Filling Pharmacy Fax:    Start Date: 1/15/2018

## 2018-01-16 NOTE — TELEPHONE ENCOUNTER
Called pharmacy to see if pt has a different insurance ma stating that pt has a primary insurance still  Called pt and left message to see if pt has a primary insurance due to insurance stating that there is another one if patient doesn't she should call her insurance to get that fixed

## 2018-01-16 NOTE — TELEPHONE ENCOUNTER
PA Not Needed     Medication: irbesartan (AVAPRO) 300 MG tablet -PA NOT NEEDED   Insurance Company:    Pharmacy Filling the Rx: [facility][id number] - [place], [place] - [address] [address]  Filling Pharmacy Phone: [phone]  Filling Pharmacy Fax:    Start Date: 1/15/2018  Patient called insurance isnt active until february unsure to if this will really need a pa or not and patient is good on her meds for right now and pharmacy has the medication on hold until they get further info on if this needs a pa or not they will let you know if it needs a pa the beginning of february

## 2018-01-16 NOTE — TELEPHONE ENCOUNTER
Patient called back and states she doesn't have another primary insurance. Patient has insurance through [employer]/ [employer]/ [place]. Patient was told by plan that it won't be active until Feb 1, 2018.   Patient still has some medications left to carry her over until then. Please call patient with anymore questions.

## 2018-01-25 ENCOUNTER — OFFICE VISIT (OUTPATIENT)
Dept: URGENT CARE | Facility: URGENT CARE | Age: 58
End: 2018-01-25
Payer: MEDICAID

## 2018-01-25 VITALS
OXYGEN SATURATION: 97 % | HEART RATE: 81 BPM | TEMPERATURE: 99 F | SYSTOLIC BLOOD PRESSURE: 118 MMHG | WEIGHT: 210.4 LBS | DIASTOLIC BLOOD PRESSURE: 64 MMHG | BODY MASS INDEX: 39.75 KG/M2

## 2018-01-25 DIAGNOSIS — I21.4 NSTEMI (NON-ST ELEVATED MYOCARDIAL INFARCTION) (H): ICD-10-CM

## 2018-01-25 DIAGNOSIS — R68.89 FLU-LIKE SYMPTOMS: Primary | ICD-10-CM

## 2018-01-25 DIAGNOSIS — J01.90 ACUTE SINUSITIS WITH COEXISTING CONDITION REQUIRING PROPHYLACTIC TREATMENT: ICD-10-CM

## 2018-01-25 DIAGNOSIS — N76.0 VAGINITIS AND VULVOVAGINITIS: ICD-10-CM

## 2018-01-25 PROCEDURE — 99214 OFFICE O/P EST MOD 30 MIN: CPT | Performed by: FAMILY MEDICINE

## 2018-01-25 RX ORDER — OSELTAMIVIR PHOSPHATE 75 MG/1
75 CAPSULE ORAL 2 TIMES DAILY
Qty: 10 CAPSULE | Refills: 0 | Status: SHIPPED | OUTPATIENT
Start: 2018-01-25 | End: 2019-03-05

## 2018-01-25 RX ORDER — BENZONATATE 200 MG/1
200 CAPSULE ORAL 3 TIMES DAILY PRN
Qty: 30 CAPSULE | Refills: 0 | Status: SHIPPED | OUTPATIENT
Start: 2018-01-25 | End: 2018-02-04

## 2018-01-25 RX ORDER — AMOXICILLIN 875 MG
875 TABLET ORAL 2 TIMES DAILY
Qty: 20 TABLET | Refills: 0 | Status: SHIPPED | OUTPATIENT
Start: 2018-01-25 | End: 2018-02-04

## 2018-01-25 RX ORDER — FLUCONAZOLE 150 MG/1
150 TABLET ORAL ONCE
Qty: 1 TABLET | Refills: 0 | Status: SHIPPED | OUTPATIENT
Start: 2018-01-25 | End: 2018-01-25

## 2018-01-25 NOTE — MR AVS SNAPSHOT
"              After Visit Summary   2018    Tania Azevedo    MRN: [id number]           Patient Information     Date Of Birth          1960        Visit Information        Provider Department      2018 5:00 PM Yanely Reyes MD [facility]        Today's Diagnoses     Flu-like symptoms    -  1    NSTEMI (non-ST elevated myocardial infarction) (H)        Acute sinusitis with coexisting condition requiring prophylactic treatment        Vaginitis and vulvovaginitis           Follow-ups after your visit        Who to contact     If you have questions or need follow up information about today's clinic visit or your schedule please contact [facility] directly at [phone].  Normal or non-critical lab and imaging results will be communicated to you by MyChart, letter or phone within 4 business days after the clinic has received the results. If you do not hear from us within 7 days, please contact the clinic through MyChart or phone. If you have a critical or abnormal lab result, we will notify you by phone as soon as possible.  Submit refill requests through [employer] or call your pharmacy and they will forward the refill request to us. Please allow 3 business days for your refill to be completed.          Additional Information About Your Visit        MyChart Information     [employer] lets you send messages to your doctor, view your test results, renew your prescriptions, schedule appointments and more. To sign up, go to [email or web address] . Click on \"Log in\" on the left side of the screen, which will take you to the Welcome page. Then click on \"Sign up Now\" on the right side of the page.     You will be asked to enter the access code listed below, as well as some personal information. Please follow the directions to create your username and password.     Your access code is: [email or web address]  Expires: 2018  5:55 PM     Your access code will  in 90 days. If you " need help or a new code, please call your [facility] clinic or [phone].        Care EveryWhere ID     This is your Care EveryWhere ID. This could be used by other organizations to access your [facility] medical records  [id number]        Your Vitals Were     Pulse Temperature Pulse Oximetry BMI (Body Mass Index)          81 99  F (37.2  C) (Tympanic) 97% 39.75 kg/m2         Blood Pressure from Last 3 Encounters:   01/25/18 118/64   09/26/17 105/65   09/01/17 115/77    Weight from Last 3 Encounters:   01/25/18 210 lb 6.4 oz (95.4 kg)   09/26/17 209 lb (94.8 kg)   08/10/17 208 lb (94.3 kg)              Today, you had the following     No orders found for display         Today's Medication Changes          These changes are accurate as of 1/25/18  5:55 PM.  If you have any questions, ask your nurse or doctor.               Start taking these medicines.        Dose/Directions    amoxicillin 875 MG tablet   Commonly known as:  AMOXIL   Used for:  Acute sinusitis with coexisting condition requiring prophylactic treatment        Dose:  875 mg   Take 1 tablet (875 mg) by mouth 2 times daily for 10 days   Quantity:  20 tablet   Refills:  0       benzonatate 200 MG capsule   Commonly known as:  TESSALON   Used for:  Flu-like symptoms        Dose:  200 mg   Take 1 capsule (200 mg) by mouth 3 times daily as needed for cough   Quantity:  30 capsule   Refills:  0       oseltamivir 75 MG capsule   Commonly known as:  TAMIFLU   Used for:  Flu-like symptoms        Dose:  75 mg   Take 1 capsule (75 mg) by mouth 2 times daily   Quantity:  10 capsule   Refills:  0         These medicines have changed or have updated prescriptions.        Dose/Directions    FLONASE 50 MCG/DOSE nasal spray   This may have changed:  See the new instructions.   Used for:  Other mental problems        INHALE 2 SPRAYS IN EACH NOSTRIL ONCE DAILY   Quantity:  3 MONTHS   Refills:  1 YEAR       * FLUCONAZOLE PO   This may have changed:  Another medication  with the same name was added. Make sure you understand how and when to take each.        Dose:  100 mg   Take 100 mg by mouth 2 tablets / as directed   Refills:  0       * fluconazole 150 MG tablet   Commonly known as:  DIFLUCAN   This may have changed:  You were already taking a medication with the same name, and this prescription was added. Make sure you understand how and when to take each.   Used for:  Vaginitis and vulvovaginitis        Dose:  150 mg   Take 1 tablet (150 mg) by mouth once for 1 dose   Quantity:  1 tablet   Refills:  0       * Notice:  This list has 2 medication(s) that are the same as other medications prescribed for you. Read the directions carefully, and ask your doctor or other care provider to review them with you.         Where to get your medicines      These medications were sent to [id number] - [facility] [address]  [address] [address] [address]     Phone:  [phone]     amoxicillin 875 MG tablet    benzonatate 200 MG capsule    fluconazole 150 MG tablet    oseltamivir 75 MG capsule                Primary Care Provider Office Phone # Fax #    Swathi Ugarte [phone]       [facility] [address] [place] [address]        Equal Access to Services     TAMIKO COLLAZO AH: Hadii nubia phelps hadsamo Sophaniali, waaxda luqadaha, qaybta kaalmada adeegyada, carlyle boyce. So [facility] [phone].    ATENCIÓN: Si habla español, tiene a ahuja disposición servicios gratuitos de asistencia lingüística. Llame al [phone].    We comply with applicable federal civil rights laws and [place] laws. We do not discriminate on the basis of race, color, national origin, age, disability, sex, sexual orientation, or gender identity.            Thank you!     Thank you for choosing [facility]  for your care. Our goal is always to provide you with excellent care. Hearing back from our patients  is one way we can continue to improve our services. Please take a few minutes to complete the written survey that you may receive in the mail after your visit with us. Thank you!             Your Updated Medication List - Protect others around you: Learn how to safely use, store and throw away your medicines at www.disposemymeds.org.          This list is accurate as of 1/25/18  5:55 PM.  Always use your most recent med list.                   Brand Name Dispense Instructions for use Diagnosis    albuterol 108 (90 BASE) MCG/ACT Inhaler    PROAIR HFA/PROVENTIL HFA/VENTOLIN HFA    1 Inhaler    Inhale 2 puffs into the lungs every 6 hours.    Bronchitis with bronchospasm       amoxicillin 875 MG tablet    AMOXIL    20 tablet    Take 1 tablet (875 mg) by mouth 2 times daily for 10 days    Acute sinusitis with coexisting condition requiring prophylactic treatment       aspirin 81 MG chewable tablet     30 tablet    Take 1 tablet (81 mg) by mouth daily    NSTEMI (non-ST elevated myocardial infarction) (H)       atorvastatin 40 MG tablet    LIPITOR    90 tablet    Take 1 tablet (40 mg) by mouth daily    NSTEMI (non-ST elevated myocardial infarction) (H)       benzonatate 200 MG capsule    TESSALON    30 capsule    Take 1 capsule (200 mg) by mouth 3 times daily as needed for cough    Flu-like symptoms       FLONASE 50 MCG/DOSE nasal spray     3 MONTHS    INHALE 2 SPRAYS IN EACH NOSTRIL ONCE DAILY    Other mental problems       * FLUCONAZOLE PO      Take 100 mg by mouth 2 tablets / as directed        * fluconazole 150 MG tablet    DIFLUCAN    1 tablet    Take 1 tablet (150 mg) by mouth once for 1 dose    Vaginitis and vulvovaginitis       hydrochlorothiazide 25 MG tablet    HYDRODIURIL    45 tablet    Take 0.5 tablets (12.5 mg) by mouth daily    Essential hypertension       irbesartan 300 MG tablet    AVAPRO    45 tablet    Take 0.5 tablets (150 mg) by mouth daily    Benign essential hypertension       metoprolol tartrate 25  MG tablet    LOPRESSOR    180 tablet    Take 1 tablet (25 mg) by mouth 2 times daily    NSTEMI (non-ST elevated myocardial infarction) (H)       nitroGLYcerin 0.4 MG sublingual tablet    NITROSTAT    25 tablet    Place 1 tablet (0.4 mg) under the tongue every 5 minutes as needed for chest pain    NSTEMI (non-ST elevated myocardial infarction) (H)       oseltamivir 75 MG capsule    TAMIFLU    10 capsule    Take 1 capsule (75 mg) by mouth 2 times daily    Flu-like symptoms       pantoprazole 40 MG EC tablet    PROTONIX    90 tablet    Take 1 tablet (40 mg) by mouth daily    Gastroesophageal reflux disease without esophagitis       sertraline 50 MG tablet    ZOLOFT    15 tablet    Take 0.5 tablets (25 mg) by mouth daily    Other mental problems       ZYRTEC 10 MG tablet   Generic drug:  cetirizine      Take 10 mg by mouth daily.        * Notice:  This list has 2 medication(s) that are the same as other medications prescribed for you. Read the directions carefully, and ask your doctor or other care provider to review them with you.

## 2018-01-25 NOTE — PROGRESS NOTES
SUBJECTIVE:                                                    Tania Azevedo is a 57 year old female who presents to clinic today for the following health issues:    RESPIRATORY SYMPTOMS      Duration: 7 days    Description  sore throat, cough, chills, headache, fatigue/malaise and hoarse voice    Severity: mild    Accompanying signs and symptoms: None    History (predisposing factors):  none    Precipitating or alleviating factors: None    Therapies tried and outcome:  Tylenol and Mucinex    Lost her voice last weekend was very tired no appetite has just progressively worsening since then  Past 4 days now started to settled on her chest  Last night started with the chills woke up with sweats   Now when she coughs throat bothers her more.   Colds, sinus congestion, facial pain  Cough Yes  Greenish discharge  Fever feeling feverish  Progressively getting worse: YES  Thought was getting better then started getting worse: NO  Getting better:  No  Exposure to pertussis or pertussis like symptoms: No    Problem list and histories reviewed & adjusted, as indicated.  Additional history: as documented    Problem list, Medication list, Allergies, and Medical/Social/Surgical histories reviewed in EPIC and updated as appropriate.    ROS:  Constitutional, HEENT, cardiovascular, pulmonary, gi and gu systems are negative, except as otherwise noted.    OBJECTIVE:                                                    /64  Pulse 81  Temp 99  F (37.2  C) (Tympanic)  Wt 210 lb 6.4 oz (95.4 kg)  SpO2 97%  BMI 39.75 kg/m2  Body mass index is 39.75 kg/(m^2).  GENERAL: healthy, alert and no distress  EYES: Eyes grossly normal to inspection, PERRL and conjunctivae and sclerae normal  HENT: ear canals and TM's normal, nose and mouth without ulcers or lesions  Sinuses: turbinates erythematous maxillary sinus tenderness   NECK: no adenopathy, no asymmetry, masses, or scars and thyroid normal to palpation  RESP: lungs clear to  auscultation - no rales, rhonchi or wheezes   CV: regular rate and rhythm, normal S1 S2, no S3 or S4, no murmur, click or rub, no peripheral edema and peripheral pulses strong  ABDOMEN: soft, nontender, no hepatosplenomegaly, no masses and bowel sounds normal  MS: no gross musculoskeletal defects noted, no edema  SKIN: no suspicious lesions or rashes  NEURO: Normal strength and tone, mentation intact and speech normal  PSYCH: mentation appears normal, affect normal/bright    Diagnostic Test Results:  No results found for this or any previous visit (from the past 24 hour(s)).     ASSESSMENT/PLAN:                                                        ICD-10-CM    1. Flu-like symptoms R68.89 oseltamivir (TAMIFLU) 75 MG capsule     benzonatate (TESSALON) 200 MG capsule   2. NSTEMI (non-ST elevated myocardial infarction) (H) I21.4    3. Acute sinusitis with coexisting condition requiring prophylactic treatment J01.90 amoxicillin (AMOXIL) 875 MG tablet   4. Vaginitis and vulvovaginitis N76.0 fluconazole (DIFLUCAN) 150 MG tablet     Flu like symptoms. Offered testing to confirm patient declined  Prescribed with tamiflu because of high risk  Prescribed with amoxicillin  Patient requested prescription for fluconazole as needed if she develops a yeast vaginitis. She says that she gets a yeast infection with antibiotics and monistat usually doesn't work. Aware that Fluconazole does have side effects that were discussed, aware of concerns for liver function and bone marrow suppression. Aware contraindicated if pregnant or breastfeeding. Advised to try monistat first then if not improved may use one dose of Fluconazole. But if persist, recommend being seen.   Prescribed with benzonatate as needed cough. Warned possible sedation.   Alarm signs or symptoms discussed, if present recommend go to ER   Possible complicationis of influenza discussed.   Recommend follow up with primary care provider if no relief in 3 days, sooner if  worse  Adverse reactions of medications discussed.  Over the counter medications discussed.   Aware to come back in if with worsening symptoms or if no relief despite treatment plan  Patient voiced understanding and had no further questions.     MD Yanely Ahumada MD  [facility]

## 2018-01-25 NOTE — LETTER
[facility]  [address] [facility] [address]  Phone: [phone]    January 25, 2018        Tania Azevedo  [address]   [facility] [address]          To whom it may concern:    RE: Tania Azevedo    Patient was seen and treated today at our clinic.  Please excuse tomorrow as well.  May return to work on Monday if no fevers for 24 hours.     Please contact me for questions or concerns.      Sincerely,        Yanely Reyes MD

## 2018-03-12 DIAGNOSIS — I21.4 NSTEMI (NON-ST ELEVATED MYOCARDIAL INFARCTION) (H): ICD-10-CM

## 2018-03-12 RX ORDER — ATORVASTATIN CALCIUM 40 MG/1
40 TABLET, FILM COATED ORAL DAILY
Qty: 90 TABLET | Refills: 3 | Status: SHIPPED | OUTPATIENT
Start: 2018-03-12 | End: 2019-01-29

## 2018-04-04 DIAGNOSIS — E78.2 MIXED HYPERLIPIDEMIA: ICD-10-CM

## 2018-04-04 LAB
ALT SERPL W P-5'-P-CCNC: 38 U/L (ref 0–50)
CHOLEST SERPL-MCNC: 145 MG/DL
HDLC SERPL-MCNC: 47 MG/DL
LDLC SERPL CALC-MCNC: 38 MG/DL
NONHDLC SERPL-MCNC: 98 MG/DL
TRIGL SERPL-MCNC: 300 MG/DL

## 2018-04-04 PROCEDURE — 36415 COLL VENOUS BLD VENIPUNCTURE: CPT | Performed by: INTERNAL MEDICINE

## 2018-04-04 PROCEDURE — 80061 LIPID PANEL: CPT | Performed by: INTERNAL MEDICINE

## 2018-04-04 PROCEDURE — 84460 ALANINE AMINO (ALT) (SGPT): CPT | Performed by: INTERNAL MEDICINE

## 2018-04-05 ENCOUNTER — TELEPHONE (OUTPATIENT)
Dept: CARDIOLOGY | Facility: CLINIC | Age: 58
End: 2018-04-05

## 2018-04-05 DIAGNOSIS — E78.2 MIXED HYPERLIPIDEMIA: Primary | ICD-10-CM

## 2018-04-05 NOTE — LETTER
April 5, 2018       TO: Tania Azevedo   [address]  [address]  [place] [address]       Dear Ms. Azevedo,    The results of your recent lipid panel are below:    Results for orders placed or performed in visit on 04/04/18   Lipid Profile   Result Value Ref Range    Cholesterol 145 <200 mg/dL    Triglycerides 300 (H) <150 mg/dL    HDL Cholesterol 47 (L) >49 mg/dL    LDL Cholesterol Calculated 38 <100 mg/dL    Non HDL Cholesterol 98 <130 mg/dL   ALT   Result Value Ref Range    ALT 38 0 - 50 U/L     Dr. Huffman would like you to continue to focus on diet, exercise, and weight loss and would like to repeat a lipid panel again in 6 months when you come back for an office visit with him. If you have any additional questions, please call [phone].      Sincerely,     Team 4 Nurses  [facility]

## 2018-04-05 NOTE — TELEPHONE ENCOUNTER
Notes Recorded by Dominick Huffman MD on 4/4/2018 at 11:12 PM  Note very high triglycerides.  This requires diet, exercise and weight loss.  Not high enough to add fibric acid.  High triglycerides and low HDL predispose to higher CAD risk.  Would repeat in 6 months.  Dominick Huffman MD, [facility]  April 4, 2018 11:11 PM    Contacted patient to review results. Reviewed Dr. Huffman's recommendation to continue to work at diet, exercise and weight loss. Patient states she will do better about focusing on exercise and a healthy diet. Also reviewed with patient that Dr. Huffman would like her lipids rechecked in 6 months. Patient verbalized understanding and agreed with plan of care. Orders placed for FLP in 6 months. Results letter mailed to patient per patient request.

## 2018-04-12 DIAGNOSIS — I10 BENIGN ESSENTIAL HYPERTENSION: ICD-10-CM

## 2018-04-12 RX ORDER — IRBESARTAN 300 MG/1
150 TABLET ORAL DAILY
Qty: 45 TABLET | Refills: 3 | Status: SHIPPED | OUTPATIENT
Start: 2018-04-12 | End: 2019-01-29

## 2018-05-04 ENCOUNTER — TELEPHONE (OUTPATIENT)
Dept: CARDIOLOGY | Facility: CLINIC | Age: 58
End: 2018-05-04

## 2018-05-04 NOTE — TELEPHONE ENCOUNTER
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 If ok, the letter can be faxed to [facility] Fax [phone].  Will route to Dr. Huffman to see if her's ok with the letter. Writer will complete the letter if ok.

## 2018-05-04 NOTE — LETTER
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        Should you have any questions, please call [phone].    Sincerely,        Dominick Huffman MD, [facility]

## 2018-05-04 NOTE — TELEPHONE ENCOUNTER
Patient called and left a message asking for a DOT letter. Called patient back but she did not answer. Left a message to call team 4 with the direct number.

## 2018-05-07 NOTE — TELEPHONE ENCOUNTER
Tried to call patient to inform her that the DOT letter has been written and will be faxed to [facility] once signed by Dr. Huffman. No answer. Left VM informing patient. Left team 4 direct number to call with any questions or concerns.

## 2018-05-07 NOTE — TELEPHONE ENCOUNTER
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  Letter can be written.  As long as she does not need to do any significant lifting, there are no restrictions.  Dominick Huffman MD, FACC  May 6, 2018 10:33 PM

## 2018-09-06 DIAGNOSIS — I21.4 NSTEMI (NON-ST ELEVATED MYOCARDIAL INFARCTION) (H): ICD-10-CM

## 2018-09-06 RX ORDER — METOPROLOL TARTRATE 25 MG/1
25 TABLET, FILM COATED ORAL 2 TIMES DAILY
Qty: 180 TABLET | Refills: 3 | Status: SHIPPED | OUTPATIENT
Start: 2018-09-06 | End: 2019-01-31

## 2018-10-03 DIAGNOSIS — I10 ESSENTIAL HYPERTENSION WITH GOAL BLOOD PRESSURE LESS THAN 140/90: ICD-10-CM

## 2018-10-03 DIAGNOSIS — E78.2 MIXED HYPERLIPIDEMIA: ICD-10-CM

## 2018-10-03 LAB
ALT SERPL W P-5'-P-CCNC: 52 U/L (ref 0–50)
ANION GAP SERPL CALCULATED.3IONS-SCNC: 7 MMOL/L (ref 3–14)
BUN SERPL-MCNC: 19 MG/DL (ref 7–30)
CALCIUM SERPL-MCNC: 9.4 MG/DL (ref 8.5–10.1)
CHLORIDE SERPL-SCNC: 101 MMOL/L (ref 94–109)
CHOLEST SERPL-MCNC: 137 MG/DL
CO2 SERPL-SCNC: 29 MMOL/L (ref 20–32)
CREAT SERPL-MCNC: 0.72 MG/DL (ref 0.52–1.04)
GFR SERPL CREATININE-BSD FRML MDRD: 82 ML/MIN/1.7M2
GLUCOSE SERPL-MCNC: 127 MG/DL (ref 70–99)
HDLC SERPL-MCNC: 45 MG/DL
LDLC SERPL CALC-MCNC: 41 MG/DL
NONHDLC SERPL-MCNC: 92 MG/DL
POTASSIUM SERPL-SCNC: 4.1 MMOL/L (ref 3.4–5.3)
SODIUM SERPL-SCNC: 137 MMOL/L (ref 133–144)
TRIGL SERPL-MCNC: 257 MG/DL

## 2018-10-03 PROCEDURE — 36415 COLL VENOUS BLD VENIPUNCTURE: CPT | Performed by: INTERNAL MEDICINE

## 2018-10-03 PROCEDURE — 80061 LIPID PANEL: CPT | Performed by: INTERNAL MEDICINE

## 2018-10-03 PROCEDURE — 84460 ALANINE AMINO (ALT) (SGPT): CPT | Performed by: INTERNAL MEDICINE

## 2018-10-03 PROCEDURE — 80048 BASIC METABOLIC PNL TOTAL CA: CPT | Performed by: INTERNAL MEDICINE

## 2018-10-09 ENCOUNTER — OFFICE VISIT (OUTPATIENT)
Dept: CARDIOLOGY | Facility: CLINIC | Age: 58
End: 2018-10-09
Payer: COMMERCIAL

## 2018-10-09 VITALS
DIASTOLIC BLOOD PRESSURE: 82 MMHG | BODY MASS INDEX: 41.72 KG/M2 | HEART RATE: 64 BPM | SYSTOLIC BLOOD PRESSURE: 126 MMHG | WEIGHT: 221 LBS | HEIGHT: 61 IN

## 2018-10-09 DIAGNOSIS — I25.10 CORONARY ARTERY DISEASE INVOLVING NATIVE CORONARY ARTERY OF NATIVE HEART WITHOUT ANGINA PECTORIS: Primary | ICD-10-CM

## 2018-10-09 DIAGNOSIS — I10 ESSENTIAL HYPERTENSION: ICD-10-CM

## 2018-10-09 DIAGNOSIS — E78.2 MIXED HYPERLIPIDEMIA: ICD-10-CM

## 2018-10-09 DIAGNOSIS — I21.4 NSTEMI (NON-ST ELEVATED MYOCARDIAL INFARCTION) (H): ICD-10-CM

## 2018-10-09 DIAGNOSIS — E78.5 DYSLIPIDEMIA: ICD-10-CM

## 2018-10-09 PROCEDURE — 99214 OFFICE O/P EST MOD 30 MIN: CPT | Performed by: INTERNAL MEDICINE

## 2018-10-09 NOTE — PROGRESS NOTES
HPI and Plan:   See dictation:[id number]    Orders Placed This Encounter   Procedures     Follow-Up with Cardiologist       No orders of the defined types were placed in this encounter.      There are no discontinued medications.      Encounter Diagnoses   Name Primary?     Coronary artery disease involving native coronary artery of native heart without angina pectoris Yes     NSTEMI (non-ST elevated myocardial infarction) (H)      Mixed hyperlipidemia      Dyslipidemia      Essential hypertension        CURRENT MEDICATIONS:  Current Outpatient Prescriptions   Medication Sig Dispense Refill     albuterol (PROVENTIL HFA: VENTOLIN HFA) 108 (90 BASE) MCG/ACT inhaler Inhale 2 puffs into the lungs every 6 hours. 1 Inhaler 2     aspirin 81 MG chewable tablet Take 1 tablet (81 mg) by mouth daily 30 tablet 0     atorvastatin (LIPITOR) 40 MG tablet Take 1 tablet (40 mg) by mouth daily 90 tablet 3     cetirizine (ZYRTEC) 10 MG tablet Take 10 mg by mouth daily.       hydrochlorothiazide (HYDRODIURIL) 25 MG tablet Take 0.5 tablets (12.5 mg) by mouth daily 45 tablet 3     irbesartan (AVAPRO) 300 MG tablet Take 0.5 tablets (150 mg) by mouth daily 45 tablet 3     metoprolol tartrate (LOPRESSOR) 25 MG tablet Take 1 tablet (25 mg) by mouth 2 times daily 180 tablet 3     nitroglycerin (NITROSTAT) 0.4 MG sublingual tablet Place 1 tablet (0.4 mg) under the tongue every 5 minutes as needed for chest pain 25 tablet 3     pantoprazole (PROTONIX) 40 MG EC tablet Take 1 tablet (40 mg) by mouth daily 90 tablet 3     sertraline (ZOLOFT) 50 MG tablet Take 0.5 tablets (25 mg) by mouth daily 15 tablet 1     FLONASE INHA 50 MCG/DOSE NA INHALE 2 SPRAYS IN EACH NOSTRIL ONCE DAILY (Patient taking differently: INHALE 2 SPRAYS IN EACH NOSTRIL ONCE DAILY PRN) 3 MONTHS 1 YEAR     FLUCONAZOLE PO Take 100 mg by mouth 2 tablets / as directed       oseltamivir (TAMIFLU) 75 MG capsule Take 1 capsule (75 mg) by mouth 2 times daily 10 capsule 0       ALLERGIES      Allergies   Allergen Reactions     Codeine        PAST MEDICAL HISTORY:  Past Medical History:   Diagnosis Date     CAD (coronary artery disease)     stent 2/9/15     Depression      Dyslipidemia      Hypertension      NSTEMI (non-ST elevated myocardial infarction) (H) 02-06-15     Tobacco abuse        PAST SURGICAL HISTORY:  Past Surgical History:   Procedure Laterality Date     C ANESTH,RADICAL HYSTERECTOMY       COLECTOMY RIGHT Right 6/3/2016    Procedure: COLECTOMY RIGHT;  Surgeon: Dillan Vaughn MD;  Location: [facility] OR     HEART CATH LEFT HEART CATH  02-09-15    SHE to RCA, Aspiration thrombectomy of RCA, 20-30% proximal LAD narrowing, 30% mid LAD, 40% mid first diagonal, prox RCA was occluded with 30% in distal RCA      HEART CATH, ANGIOPLASTY       LAPAROSCOPIC APPENDECTOMY N/A 6/3/2016    Procedure: LAPAROSCOPIC APPENDECTOMY;  Surgeon: Dillan Vaughn MD;  Location: [facility] OR       FAMILY HISTORY:  Family History   Problem Relation Age of Onset     Unknown/Adopted Mother      Unknown/Adopted Father        SOCIAL HISTORY:  Social History     Social History     Marital status: Single     Spouse name: N/A     Number of children: N/A     Years of education: N/A     Social History Main Topics     Smoking status: Former Smoker     Packs/day: 1.00     Types: Cigarettes     Start date: 1975     Quit date: 2014     Smokeless tobacco: Never Used      Comment: 2/6/15 - quit e cig     Alcohol use No     Drug use: No     Sexual activity: Yes     Partners: Male     Birth control/ protection: Surgical     Other Topics Concern     Caffeine Concern Yes     1 can of diet soda and once in a while a coffee      Sleep Concern Yes     not lately     Stress Concern Yes     Significant other - Terminal Cancer     Special Diet No     Exercise Yes     some 2 days per week      Seat Belt Yes     Social History Narrative       Review of Systems:  Skin:  Negative       Eyes:  Positive for glasses    ENT:  Negative     "  Respiratory:  Negative       Cardiovascular:  Negative      Gastroenterology: Negative      Genitourinary:  not assessed      Musculoskeletal:  Positive for joint pain    Neurologic:  Negative      Psychiatric:  Negative      Heme/Lymph/Imm:  Positive for allergies;hay fever    Endocrine:  Negative        Physical Exam:  Vitals: /82  Pulse 64  Ht 1.549 m (5' 1\")  Wt 100.2 kg (221 lb)  BMI 41.76 kg/m2    Constitutional:  cooperative, alert and oriented, well developed, well nourished, in no acute distress obese      Skin:  warm and dry to the touch, no apparent skin lesions or masses noted          Head:  normocephalic, no masses or lesions        Eyes:  pupils equal and round, conjunctivae and lids unremarkable, sclera white, no xanthalasma, EOMS intact, no nystagmus        Lymph:      ENT:  no pallor or cyanosis, dentition good        Neck:  carotid pulses are full and equal bilaterally, JVP normal, no carotid bruit        Respiratory:  normal breath sounds, clear to auscultation, normal A-P diameter, normal symmetry, normal respiratory excursion, no use of accessory muscles         Cardiac: regular rhythm;normal S1 and S2;apical impulse not displaced   S4 no presence of murmur       right radial site without hematoma or bruit  pulses full and equal, no bruits auscultated                                        GI:  abdomen soft, non-tender, BS normoactive, no mass, no HSM, no bruits        Extremities and Muscular Skeletal:  no deformities, clubbing, cyanosis, erythema observed;no edema              Neurological:  no gross motor deficits;affect appropriate        Psych:  Alert and Oriented x 3        CC  Swathi Ugarte, [facility]  [address] [place] [address]              "

## 2018-10-09 NOTE — MR AVS SNAPSHOT
"              After Visit Summary   10/9/2018    Tania Azevedo    MRN: [id number]           Patient Information     Date Of Birth          1960        Visit Information        Provider Department      10/9/2018 3:15 PM Dominick Huffman MD [facility]   [facility]        Today's Diagnoses     Coronary artery disease involving native coronary artery of native heart without angina pectoris    -  1    NSTEMI (non-ST elevated myocardial infarction) (H)        Mixed hyperlipidemia        Dyslipidemia        Essential hypertension           Follow-ups after your visit        Additional Services     Follow-Up with Cardiologist                 Future tests that were ordered for you today     Open Future Orders        Priority Expected Expires Ordered    Follow-Up with Cardiologist Routine 3/13/2019 10/9/2019 10/9/2018            Who to contact     If you have questions or need follow up information about today's clinic visit or your schedule please contact [facility]   [facility] directly at [phone].  Normal or non-critical lab and imaging results will be communicated to you by MyChart, letter or phone within 4 business days after the clinic has received the results. If you do not hear from us within 7 days, please contact the clinic through MyChart or phone. If you have a critical or abnormal lab result, we will notify you by phone as soon as possible.  Submit refill requests through [employer] or call your pharmacy and they will forward the refill request to us. Please allow 3 business days for your refill to be completed.          Additional Information About Your Visit        Care EveryWhere ID     This is your Care EveryWhere ID. This could be used by other organizations to access your [place] medical records  [id number]        Your Vitals Were     Pulse Height BMI (Body Mass Index)             64 1.549 m (5' 1\") 41.76 kg/m2          Blood Pressure from " Last 3 Encounters:   10/09/18 126/82   01/25/18 118/64   09/26/17 105/65    Weight from Last 3 Encounters:   10/09/18 100.2 kg (221 lb)   01/25/18 95.4 kg (210 lb 6.4 oz)   09/26/17 94.8 kg (209 lb)                 Today's Medication Changes          These changes are accurate as of 10/9/18  3:58 PM.  If you have any questions, ask your nurse or doctor.               These medicines have changed or have updated prescriptions.        Dose/Directions    FLONASE 50 MCG/DOSE nasal spray   This may have changed:  See the new instructions.   Used for:  Other mental problems        INHALE 2 SPRAYS IN EACH NOSTRIL ONCE DAILY   Quantity:  3 MONTHS   Refills:  1 YEAR                Primary Care Provider Office Phone # Fax #    Swathi MCKENNA DO Shanel [phone]       [facility] [id number] [place] [address]        Equal Access to Services     TAMIKO COLLAZO AH: Hadii nubia ku hadasho Soomaali, waaxda luqadaha, qaybta kaalmada adeegyada, waxay katharinain haykendy dobson . So [facility] [phone].    ATENCIÓN: Si eddie miller, tiene a ahuja disposición servicios gratuitos de asistencia lingüística. Llame al [phone].    We comply with applicable federal civil rights laws and [place] laws. We do not discriminate on the basis of race, color, national origin, age, disability, sex, sexual orientation, or gender identity.            Thank you!     Thank you for choosing [facility]  for your care. Our goal is always to provide you with excellent care. Hearing back from our patients is one way we can continue to improve our services. Please take a few minutes to complete the written survey that you may receive in the mail after your visit with us. Thank you!             Your Updated Medication List - Protect others around you: Learn how to safely use, store and throw away your medicines at www.disposemymeds.org.          This list is accurate as of 10/9/18   3:58 PM.  Always use your most recent med list.                   Brand Name Dispense Instructions for use Diagnosis    albuterol 108 (90 Base) MCG/ACT inhaler    PROAIR HFA/PROVENTIL HFA/VENTOLIN HFA    1 Inhaler    Inhale 2 puffs into the lungs every 6 hours.    Bronchitis with bronchospasm       aspirin 81 MG chewable tablet     30 tablet    Take 1 tablet (81 mg) by mouth daily    NSTEMI (non-ST elevated myocardial infarction) (H)       atorvastatin 40 MG tablet    LIPITOR    90 tablet    Take 1 tablet (40 mg) by mouth daily    NSTEMI (non-ST elevated myocardial infarction) (H)       FLONASE 50 MCG/DOSE nasal spray     3 MONTHS    INHALE 2 SPRAYS IN EACH NOSTRIL ONCE DAILY    Other mental problems       FLUCONAZOLE PO      Take 100 mg by mouth 2 tablets / as directed        hydrochlorothiazide 25 MG tablet    HYDRODIURIL    45 tablet    Take 0.5 tablets (12.5 mg) by mouth daily    Essential hypertension       irbesartan 300 MG tablet    AVAPRO    45 tablet    Take 0.5 tablets (150 mg) by mouth daily    Benign essential hypertension       metoprolol tartrate 25 MG tablet    LOPRESSOR    180 tablet    Take 1 tablet (25 mg) by mouth 2 times daily    NSTEMI (non-ST elevated myocardial infarction) (H)       nitroGLYcerin 0.4 MG sublingual tablet    NITROSTAT    25 tablet    Place 1 tablet (0.4 mg) under the tongue every 5 minutes as needed for chest pain    NSTEMI (non-ST elevated myocardial infarction) (H)       oseltamivir 75 MG capsule    TAMIFLU    10 capsule    Take 1 capsule (75 mg) by mouth 2 times daily    Flu-like symptoms       pantoprazole 40 MG EC tablet    PROTONIX    90 tablet    Take 1 tablet (40 mg) by mouth daily    Gastroesophageal reflux disease without esophagitis       sertraline 50 MG tablet    ZOLOFT    15 tablet    Take 0.5 tablets (25 mg) by mouth daily    Other mental problems       ZYRTEC 10 MG tablet   Generic drug:  cetirizine      Take 10 mg by mouth daily.

## 2018-10-09 NOTE — LETTER
10/9/2018    Swathi Ugarte, DO  Formerly [facility] [address]  [facility] [address]    RE: Tania Azevedo       Dear Colleague,    I had the pleasure of seeing Tania Azevedo in the [facility].    HPI and Plan:   See dictation:[id number]    Orders Placed This Encounter   Procedures     Follow-Up with Cardiologist       No orders of the defined types were placed in this encounter.      There are no discontinued medications.      Encounter Diagnoses   Name Primary?     Coronary artery disease involving native coronary artery of native heart without angina pectoris Yes     NSTEMI (non-ST elevated myocardial infarction) (H)      Mixed hyperlipidemia      Dyslipidemia      Essential hypertension        CURRENT MEDICATIONS:  Current Outpatient Prescriptions   Medication Sig Dispense Refill     albuterol (PROVENTIL HFA: VENTOLIN HFA) 108 (90 BASE) MCG/ACT inhaler Inhale 2 puffs into the lungs every 6 hours. 1 Inhaler 2     aspirin 81 MG chewable tablet Take 1 tablet (81 mg) by mouth daily 30 tablet 0     atorvastatin (LIPITOR) 40 MG tablet Take 1 tablet (40 mg) by mouth daily 90 tablet 3     cetirizine (ZYRTEC) 10 MG tablet Take 10 mg by mouth daily.       hydrochlorothiazide (HYDRODIURIL) 25 MG tablet Take 0.5 tablets (12.5 mg) by mouth daily 45 tablet 3     irbesartan (AVAPRO) 300 MG tablet Take 0.5 tablets (150 mg) by mouth daily 45 tablet 3     metoprolol tartrate (LOPRESSOR) 25 MG tablet Take 1 tablet (25 mg) by mouth 2 times daily 180 tablet 3     nitroglycerin (NITROSTAT) 0.4 MG sublingual tablet Place 1 tablet (0.4 mg) under the tongue every 5 minutes as needed for chest pain 25 tablet 3     pantoprazole (PROTONIX) 40 MG EC tablet Take 1 tablet (40 mg) by mouth daily 90 tablet 3     sertraline (ZOLOFT) 50 MG tablet Take 0.5 tablets (25 mg) by mouth daily 15 tablet 1     FLONASE INHA 50 MCG/DOSE NA INHALE 2 SPRAYS IN EACH NOSTRIL ONCE DAILY (Patient taking differently:  INHALE 2 SPRAYS IN EACH NOSTRIL ONCE DAILY PRN) 3 MONTHS 1 YEAR     FLUCONAZOLE PO Take 100 mg by mouth 2 tablets / as directed       oseltamivir (TAMIFLU) 75 MG capsule Take 1 capsule (75 mg) by mouth 2 times daily 10 capsule 0       ALLERGIES     Allergies   Allergen Reactions     Codeine        PAST MEDICAL HISTORY:  Past Medical History:   Diagnosis Date     CAD (coronary artery disease)     stent 2/9/15     Depression      Dyslipidemia      Hypertension      NSTEMI (non-ST elevated myocardial infarction) (H) 02-06-15     Tobacco abuse        PAST SURGICAL HISTORY:  Past Surgical History:   Procedure Laterality Date     C ANESTH,RADICAL HYSTERECTOMY       COLECTOMY RIGHT Right 6/3/2016    Procedure: COLECTOMY RIGHT;  Surgeon: Dillan Vaughn MD;  Location: [facility] OR     HEART CATH LEFT HEART CATH  02-09-15    SHE to RCA, Aspiration thrombectomy of RCA, 20-30% proximal LAD narrowing, 30% mid LAD, 40% mid first diagonal, prox RCA was occluded with 30% in distal RCA      HEART CATH, ANGIOPLASTY       LAPAROSCOPIC APPENDECTOMY N/A 6/3/2016    Procedure: LAPAROSCOPIC APPENDECTOMY;  Surgeon: Dillan Vaughn MD;  Location: [facility] OR       FAMILY HISTORY:  Family History   Problem Relation Age of Onset     Unknown/Adopted Mother      Unknown/Adopted Father        SOCIAL HISTORY:  Social History     Social History     Marital status: Single     Spouse name: N/A     Number of children: N/A     Years of education: N/A     Social History Main Topics     Smoking status: Former Smoker     Packs/day: 1.00     Types: Cigarettes     Start date: 1975     Quit date: 2014     Smokeless tobacco: Never Used      Comment: 2/6/15 - quit e cig     Alcohol use No     Drug use: No     Sexual activity: Yes     Partners: Male     Birth control/ protection: Surgical     Other Topics Concern     Caffeine Concern Yes     1 can of diet soda and once in a while a coffee      Sleep Concern Yes     not lately     Stress Concern Yes      "Significant other - Terminal Cancer     Special Diet No     Exercise Yes     some 2 days per week      Seat Belt Yes     Social History Narrative       Review of Systems:  Skin:  Negative       Eyes:  Positive for glasses    ENT:  Negative      Respiratory:  Negative       Cardiovascular:  Negative      Gastroenterology: Negative      Genitourinary:  not assessed      Musculoskeletal:  Positive for joint pain    Neurologic:  Negative      Psychiatric:  Negative      Heme/Lymph/Imm:  Positive for allergies;hay fever    Endocrine:  Negative        Physical Exam:  Vitals: /82  Pulse 64  Ht 1.549 m (5' 1\")  Wt 100.2 kg (221 lb)  BMI 41.76 kg/m2    Constitutional:  cooperative, alert and oriented, well developed, well nourished, in no acute distress obese      Skin:  warm and dry to the touch, no apparent skin lesions or masses noted          Head:  normocephalic, no masses or lesions        Eyes:  pupils equal and round, conjunctivae and lids unremarkable, sclera white, no xanthalasma, EOMS intact, no nystagmus        Lymph:      ENT:  no pallor or cyanosis, dentition good        Neck:  carotid pulses are full and equal bilaterally, JVP normal, no carotid bruit        Respiratory:  normal breath sounds, clear to auscultation, normal A-P diameter, normal symmetry, normal respiratory excursion, no use of accessory muscles         Cardiac: regular rhythm;normal S1 and S2;apical impulse not displaced   S4 no presence of murmur       right radial site without hematoma or bruit  pulses full and equal, no bruits auscultated                                        GI:  abdomen soft, non-tender, BS normoactive, no mass, no HSM, no bruits        Extremities and Muscular Skeletal:  no deformities, clubbing, cyanosis, erythema observed;no edema              Neurological:  no gross motor deficits;affect appropriate        Psych:  Alert and Oriented x 3        CC  Swathi Ugarte, DO  [facility]  [address] [place] [address]                Thank you for allowing me to participate in the care of your patient.      Sincerely,     Dominick Huffman MD     [facility]    cc:   Swathi Ugarte, [facility]  [address] [place] [place] [address]

## 2018-10-09 NOTE — LETTER
10/9/2018      Swathi Ugarte, DO  [employer] [place] [address] [facility] [address]      RE: Tania Azevedo       Dear Colleague,    I had the pleasure of seeing Tania Azevedo in the [facility].    Service Date: 10/09/2018      PRIMARY CARE PHYSICIAN:  Dr. Swathi Ugarte.      HISTORY OF PRESENT ILLNESS:  I again had the pleasure of seeing Tania Azevedo at [facility] [place] [facility] for evaluation of coronary artery disease, hypertension and mixed hyperlipidemia.  The patient drives a minivan for work.  She is a 58-year-old female status post non-ST elevation myocardial infarction in 02/2015.  We found a total occlusion of her right coronary artery with mild to moderate disease in her other vessels.  Intracoronary stenting and thrombectomy was performed on the right coronary artery.  She remains free of angina.  She moved to [place] to live with her daughter.  She does not exercise at all.  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  She denies significant dyspnea on exertion, chest discomfort, palpitations, syncope or presyncope.  She was a long-term smoker but has not returned to smoking since her heart attack.  She continues to have some phlegm production.  She has difficulty losing weight because of excess calories and portion sizes.  She denies myalgias or myopathy.  Hydrochlorothiazide was added for hypertension.  Her most recent labs include on 10/03 total cholesterol 137, HDL 45, LDL 41, triglycerides 252 with ALT slightly elevated at 52.  Sodium 137, potassium 4.1, BUN 19, creatinine 0.72.  Importantly her glucose is 127.  One year ago it was 129.      PHYSICAL EXAMINATION:  As listed below.      ASSESSMENT:   1.  Tania Azevedo is a delightful 58-year-old female status post inferior wall myocardial infarction in 02/2015 with an ejection fraction initially of 40%-45%.  Stress echocardiogram 02/26/2016 demonstrated no ongoing ischemia with mild  inferior wall hypokinesis not changed with exercise.  Her ejection fraction was 55%-60%.  We again had a long discussion regarding an exercise program and weight loss.  We will consider a stress echocardiogram in .   2.  Morbid obesity.  We discussed weight loss and exercise at length.   3.  Systolic hypertension currently under excellent control on her current medications.   4.  Possible sleep apnea.  I have asked her to discuss a referral for sleep study with her primary care physician.   5.  Hyperglycemia.  This patient likely has insulin resistance based on her weight.  For now diet and exercise would suffice in order to correct this.  This was discussed at length as well.   6.  The patient requests followup in March since her DOT physical is in April.  I will make this appointment.      It is my pleasure to assist in the care of Tania Araujo.  All her questions were answered to her satisfaction.      MD CYNDI Goodwin MD, [facility]             D: 10/09/2018   T: 10/09/2018   MT: KIM      Name:     TANIA ARAUJO   MRN:      [id number]        Account:      [id number]   :      1960           Service Date: 10/09/2018      Document: [id number]           Outpatient Encounter Prescriptions as of 10/9/2018   Medication Sig Dispense Refill     albuterol (PROVENTIL HFA: VENTOLIN HFA) 108 (90 BASE) MCG/ACT inhaler Inhale 2 puffs into the lungs every 6 hours. 1 Inhaler 2     aspirin 81 MG chewable tablet Take 1 tablet (81 mg) by mouth daily 30 tablet 0     atorvastatin (LIPITOR) 40 MG tablet Take 1 tablet (40 mg) by mouth daily 90 tablet 3     cetirizine (ZYRTEC) 10 MG tablet Take 10 mg by mouth daily.       hydrochlorothiazide (HYDRODIURIL) 25 MG tablet Take 0.5 tablets (12.5 mg) by mouth daily 45 tablet 3     irbesartan (AVAPRO) 300 MG tablet Take 0.5 tablets (150 mg) by mouth daily 45 tablet 3     metoprolol tartrate (LOPRESSOR) 25 MG tablet Take 1 tablet (25 mg) by mouth 2 times  daily 180 tablet 3     nitroglycerin (NITROSTAT) 0.4 MG sublingual tablet Place 1 tablet (0.4 mg) under the tongue every 5 minutes as needed for chest pain 25 tablet 3     pantoprazole (PROTONIX) 40 MG EC tablet Take 1 tablet (40 mg) by mouth daily 90 tablet 3     sertraline (ZOLOFT) 50 MG tablet Take 0.5 tablets (25 mg) by mouth daily 15 tablet 1     FLONASE INHA 50 MCG/DOSE NA INHALE 2 SPRAYS IN EACH NOSTRIL ONCE DAILY (Patient taking differently: INHALE 2 SPRAYS IN EACH NOSTRIL ONCE DAILY PRN) 3 MONTHS 1 YEAR     FLUCONAZOLE PO Take 100 mg by mouth 2 tablets / as directed       oseltamivir (TAMIFLU) 75 MG capsule Take 1 capsule (75 mg) by mouth 2 times daily 10 capsule 0     No facility-administered encounter medications on file as of 10/9/2018.        Again, thank you for allowing me to participate in the care of your patient.      Sincerely,    Dominick Huffman MD     [facility]

## 2018-10-10 NOTE — PROGRESS NOTES
Service Date: 10/09/2018      PRIMARY CARE PHYSICIAN:  Dr. Swathi Ugarte.      HISTORY OF PRESENT ILLNESS:  I again had the pleasure of seeing Tania Azevedo at [facility] for evaluation of coronary artery disease, hypertension and mixed hyperlipidemia.  The patient drives a minivan for work.  She is a 58-year-old female status post non-ST elevation myocardial infarction in 02/2015.  We found a total occlusion of her right coronary artery with mild to moderate disease in her other vessels.  Intracoronary stenting and thrombectomy was performed on the right coronary artery.  She remains free of angina.  She moved to [place] to live with her daughter.  She does not exercise at all.  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  She denies significant dyspnea on exertion, chest discomfort, palpitations, syncope or presyncope.  She was a long-term smoker but has not returned to smoking since her heart attack.  She continues to have some phlegm production.  She has difficulty losing weight because of excess calories and portion sizes.  She denies myalgias or myopathy.  Hydrochlorothiazide was added for hypertension.  Her most recent labs include on 10/03 total cholesterol 137, HDL 45, LDL 41, triglycerides 252 with ALT slightly elevated at 52.  Sodium 137, potassium 4.1, BUN 19, creatinine 0.72.  Importantly her glucose is 127.  One year ago it was 129.      PHYSICAL EXAMINATION:  As listed below.      ASSESSMENT:   1.  Tania Azevedo is a delightful 58-year-old female status post inferior wall myocardial infarction in 02/2015 with an ejection fraction initially of 40%-45%.  Stress echocardiogram 02/26/2016 demonstrated no ongoing ischemia with mild inferior wall hypokinesis not changed with exercise.  Her ejection fraction was 55%-60%.  We again had a long discussion regarding an exercise program and weight loss.  We will consider a stress echocardiogram in 2020.   2.  Morbid obesity.  We discussed  weight loss and exercise at length.   3.  Systolic hypertension currently under excellent control on her current medications.   4.  Possible sleep apnea.  I have asked her to discuss a referral for sleep study with her primary care physician.   5.  Hyperglycemia.  This patient likely has insulin resistance based on her weight.  For now diet and exercise would suffice in order to correct this.  This was discussed at length as well.   6.  The patient requests followup in March since her DOT physical is in April.  I will make this appointment.      It is my pleasure to assist in the care of Tania Araujo.  All her questions were answered to her satisfaction.      MD CYNDI Goodwin MD, [facility]             D: 10/09/2018   T: 10/09/2018   MT: KIM      Name:     TANIA ARAUJO   MRN:      [id number]        Account:      [id number]   :      1960           Service Date: 10/09/2018      Document: [id number]

## 2018-12-31 DIAGNOSIS — I10 ESSENTIAL HYPERTENSION: ICD-10-CM

## 2018-12-31 RX ORDER — HYDROCHLOROTHIAZIDE 25 MG/1
12.5 TABLET ORAL DAILY
Qty: 45 TABLET | Refills: 3 | Status: SHIPPED | OUTPATIENT
Start: 2018-12-31 | End: 2019-01-22

## 2019-01-22 DIAGNOSIS — I10 ESSENTIAL HYPERTENSION: ICD-10-CM

## 2019-01-22 RX ORDER — HYDROCHLOROTHIAZIDE 25 MG/1
12.5 TABLET ORAL DAILY
Qty: 45 TABLET | Refills: 2 | Status: SHIPPED | OUTPATIENT
Start: 2019-01-22 | End: 2019-08-19

## 2019-01-29 DIAGNOSIS — I10 BENIGN ESSENTIAL HYPERTENSION: ICD-10-CM

## 2019-01-29 DIAGNOSIS — I21.4 NSTEMI (NON-ST ELEVATED MYOCARDIAL INFARCTION) (H): ICD-10-CM

## 2019-01-29 RX ORDER — IRBESARTAN 300 MG/1
150 TABLET ORAL DAILY
Qty: 45 TABLET | Refills: 0 | Status: SHIPPED | OUTPATIENT
Start: 2019-01-29 | End: 2019-03-05

## 2019-01-29 RX ORDER — ATORVASTATIN CALCIUM 40 MG/1
40 TABLET, FILM COATED ORAL DAILY
Qty: 90 TABLET | Refills: 0 | Status: SHIPPED | OUTPATIENT
Start: 2019-01-29 | End: 2019-03-05

## 2019-01-31 DIAGNOSIS — I21.4 NSTEMI (NON-ST ELEVATED MYOCARDIAL INFARCTION) (H): ICD-10-CM

## 2019-01-31 RX ORDER — METOPROLOL TARTRATE 25 MG/1
25 TABLET, FILM COATED ORAL 2 TIMES DAILY
Qty: 180 TABLET | Refills: 3 | Status: SHIPPED | OUTPATIENT
Start: 2019-01-31 | End: 2019-12-26

## 2019-03-05 ENCOUNTER — OFFICE VISIT (OUTPATIENT)
Dept: CARDIOLOGY | Facility: CLINIC | Age: 59
End: 2019-03-05
Payer: COMMERCIAL

## 2019-03-05 VITALS
SYSTOLIC BLOOD PRESSURE: 120 MMHG | HEART RATE: 82 BPM | HEIGHT: 61 IN | DIASTOLIC BLOOD PRESSURE: 80 MMHG | BODY MASS INDEX: 41.72 KG/M2 | WEIGHT: 221 LBS

## 2019-03-05 DIAGNOSIS — I25.10 CORONARY ARTERY DISEASE INVOLVING NATIVE CORONARY ARTERY OF NATIVE HEART WITHOUT ANGINA PECTORIS: Primary | ICD-10-CM

## 2019-03-05 DIAGNOSIS — E78.2 MIXED HYPERLIPIDEMIA: ICD-10-CM

## 2019-03-05 DIAGNOSIS — I10 BENIGN ESSENTIAL HYPERTENSION: ICD-10-CM

## 2019-03-05 DIAGNOSIS — I21.4 NSTEMI (NON-ST ELEVATED MYOCARDIAL INFARCTION) (H): ICD-10-CM

## 2019-03-05 PROCEDURE — 99214 OFFICE O/P EST MOD 30 MIN: CPT | Performed by: INTERNAL MEDICINE

## 2019-03-05 RX ORDER — ATORVASTATIN CALCIUM 40 MG/1
40 TABLET, FILM COATED ORAL DAILY
Qty: 90 TABLET | Refills: 3 | Status: SHIPPED | OUTPATIENT
Start: 2019-03-05 | End: 2020-02-11

## 2019-03-05 RX ORDER — IRBESARTAN 150 MG/1
150 TABLET ORAL DAILY
Qty: 90 TABLET | Refills: 3 | Status: SHIPPED | OUTPATIENT
Start: 2019-03-05 | End: 2020-02-28

## 2019-03-05 ASSESSMENT — MIFFLIN-ST. JEOR: SCORE: 1514.83

## 2019-03-05 NOTE — PROGRESS NOTES
HPI and Plan:   See dictation:[id number]    Orders Placed This Encounter   Procedures     Lipid Profile     Follow-Up with Cardiologist       Orders Placed This Encounter   Medications     irbesartan (AVAPRO) 150 MG tablet     Sig: Take 1 tablet (150 mg) by mouth daily     Dispense:  90 tablet     Refill:  3     atorvastatin (LIPITOR) 40 MG tablet     Sig: Take 1 tablet (40 mg) by mouth daily     Dispense:  90 tablet     Refill:  3       Medications Discontinued During This Encounter   Medication Reason     oseltamivir (TAMIFLU) 75 MG capsule Therapy completed     irbesartan (AVAPRO) 300 MG tablet Reorder     atorvastatin (LIPITOR) 40 MG tablet Reorder         Encounter Diagnoses   Name Primary?     Coronary artery disease involving native coronary artery of native heart without angina pectoris Yes     NSTEMI (non-ST elevated myocardial infarction) (H)      Benign essential hypertension      Mixed hyperlipidemia        CURRENT MEDICATIONS:  Current Outpatient Medications   Medication Sig Dispense Refill     albuterol (PROVENTIL HFA: VENTOLIN HFA) 108 (90 BASE) MCG/ACT inhaler Inhale 2 puffs into the lungs every 6 hours. 1 Inhaler 2     aspirin 81 MG chewable tablet Take 1 tablet (81 mg) by mouth daily 30 tablet 0     atorvastatin (LIPITOR) 40 MG tablet Take 1 tablet (40 mg) by mouth daily 90 tablet 3     cetirizine (ZYRTEC) 10 MG tablet Take 10 mg by mouth daily.       FLONASE INHA 50 MCG/DOSE NA INHALE 2 SPRAYS IN EACH NOSTRIL ONCE DAILY (Patient taking differently: INHALE 2 SPRAYS IN EACH NOSTRIL ONCE DAILY PRN) 3 MONTHS 1 YEAR     FLUCONAZOLE PO Take 100 mg by mouth 2 tablets / as directed       hydrochlorothiazide (HYDRODIURIL) 25 MG tablet Take 0.5 tablets (12.5 mg) by mouth daily 45 tablet 2     irbesartan (AVAPRO) 150 MG tablet Take 1 tablet (150 mg) by mouth daily 90 tablet 3     metoprolol tartrate (LOPRESSOR) 25 MG tablet Take 1 tablet (25 mg) by mouth 2 times daily 180 tablet 3     nitroglycerin (NITROSTAT)  0.4 MG sublingual tablet Place 1 tablet (0.4 mg) under the tongue every 5 minutes as needed for chest pain 25 tablet 3     pantoprazole (PROTONIX) 40 MG EC tablet Take 1 tablet (40 mg) by mouth daily 90 tablet 3     sertraline (ZOLOFT) 50 MG tablet Take 0.5 tablets (25 mg) by mouth daily 15 tablet 1       ALLERGIES     Allergies   Allergen Reactions     Codeine        PAST MEDICAL HISTORY:  Past Medical History:   Diagnosis Date     CAD (coronary artery disease)     stent 2/9/15     Depression      Dyslipidemia      Hypertension      NSTEMI (non-ST elevated myocardial infarction) (H) 02-06-15     Tobacco abuse        PAST SURGICAL HISTORY:  Past Surgical History:   Procedure Laterality Date     C ANESTH,RADICAL HYSTERECTOMY       COLECTOMY RIGHT Right 6/3/2016    Procedure: COLECTOMY RIGHT;  Surgeon: Dillan Vaughn MD;  Location: [facility] OR     HEART CATH LEFT HEART CATH  02-09-15    SHE to RCA, Aspiration thrombectomy of RCA, 20-30% proximal LAD narrowing, 30% mid LAD, 40% mid first diagonal, prox RCA was occluded with 30% in distal RCA      HEART CATH, ANGIOPLASTY       LAPAROSCOPIC APPENDECTOMY N/A 6/3/2016    Procedure: LAPAROSCOPIC APPENDECTOMY;  Surgeon: Dillan Vaughn MD;  Location: [facility] OR       FAMILY HISTORY:  Family History   Problem Relation Age of Onset     Unknown/Adopted Mother      Unknown/Adopted Father        SOCIAL HISTORY:  Social History     Socioeconomic History     Marital status: Single     Spouse name: None     Number of children: None     Years of education: None     Highest education level: None   Occupational History     None   Social Needs     Financial resource strain: None     Food insecurity:     Worry: None     Inability: None     Transportation needs:     Medical: None     Non-medical: None   Tobacco Use     Smoking status: Former Smoker     Packs/day: 1.00     Types: Cigarettes     Start date:      Last attempt to quit:      Years since quittin.1      Smokeless tobacco: Never Used     Tobacco comment: 2/6/15 - quit e cig   Substance and Sexual Activity     Alcohol use: No     Alcohol/week: 0.0 oz     Drug use: No     Sexual activity: Yes     Partners: Male     Birth control/protection: Surgical   Lifestyle     Physical activity:     Days per week: None     Minutes per session: None     Stress: None   Relationships     Social connections:     Talks on phone: None     Gets together: None     Attends Confucianism service: None     Active member of club or organization: None     Attends meetings of clubs or organizations: None     Relationship status: None     Intimate partner violence:     Fear of current or ex partner: None     Emotionally abused: None     Physically abused: None     Forced sexual activity: None   Other Topics Concern     Parent/sibling w/ CABG, MI or angioplasty before 65F 55M? Not Asked      Service Not Asked     Blood Transfusions Not Asked     Caffeine Concern Yes     Comment: 1 can of diet soda and once in a while a coffee      Occupational Exposure Not Asked     Hobby Hazards Not Asked     Sleep Concern Yes     Comment: not lately     Stress Concern Yes     Comment: Significant other - Terminal Cancer     Weight Concern Not Asked     Special Diet No     Back Care Not Asked     Exercise Yes     Comment: some 2 days per week      Bike Helmet Not Asked     Seat Belt Yes     Self-Exams Not Asked   Social History Narrative     None       Review of Systems:  Skin:  Negative       Eyes:  Positive for glasses    ENT:  Negative      Respiratory:  Negative       Cardiovascular:  Negative      Gastroenterology: Negative      Genitourinary:  not assessed      Musculoskeletal:  Positive for joint pain(hips) (toe cramps)  Neurologic:  Negative      Psychiatric:  Positive for excessive stress;depression;anxiety    Heme/Lymph/Imm:  Positive for allergies;hay fever    Endocrine:  Negative        Physical Exam:  Vitals: /80   Pulse 82   Ht 1.549 m  "(5' 1\")   Wt 100.2 kg (221 lb)   BMI 41.76 kg/m      Constitutional:  cooperative, alert and oriented, well developed, well nourished, in no acute distress obese      Skin:  warm and dry to the touch, no apparent skin lesions or masses noted          Head:  normocephalic, no masses or lesions        Eyes:  pupils equal and round, conjunctivae and lids unremarkable, sclera white, no xanthalasma, EOMS intact, no nystagmus        Lymph:      ENT:  no pallor or cyanosis, dentition good        Neck:  carotid pulses are full and equal bilaterally, JVP normal, no carotid bruit        Respiratory:  normal breath sounds, clear to auscultation, normal A-P diameter, normal symmetry, normal respiratory excursion, no use of accessory muscles         Cardiac: regular rhythm;normal S1 and S2;apical impulse not displaced   S4 no presence of murmur       right radial site without hematoma or bruit  pulses full and equal, no bruits auscultated                                        GI:  abdomen soft, non-tender, BS normoactive, no mass, no HSM, no bruits        Extremities and Muscular Skeletal:  no deformities, clubbing, cyanosis, erythema observed;no edema              Neurological:  no gross motor deficits;affect appropriate        Psych:  Alert and Oriented x 3        CC  Dominick Huffman MD  [address]  [place] [id number]              "

## 2019-03-05 NOTE — PROGRESS NOTES
Service Date: 03/05/2019      PRIMARY CARE PHYSICIAN:  Dr. Judah Alexandre.      HISTORY OF PRESENT ILLNESS:  I again had the pleasure of seeing your patient, Tania Azevedo, at [facility] for evaluation of coronary artery disease, hypertension and mixed hyperlipidemia.  The patient drives a minivan for work.  She is a 58-year-old female status post non-ST elevation myocardial infarction in 02/2015.  We found a total occlusion of her right coronary artery with mild to moderate disease in her other vessels.  Intracoronary stenting and thrombectomy was performed on the right coronary artery.  She remains free of angina or significant shortness of breath.  She moved to [place] to live with her daughter.  She does not exercise over the last several months after a viral illness.  Previously she was beginning to exercise on a treadmill at 1 mile 3 times a week.  She is a nonsmoker since the time of her myocardial infarction and stenting.  She does not drink alcohol.  She denies significant dyspnea on exertion, chest discomfort, palpitations, syncope or presyncope.  She has difficulty losing weight because of excess calories and portion sizes.  She denies myalgias or myopathy.  Hydrochlorothiazide was added for hypertension.  Her most recent fasting lipid profile from 10/03/2018 includes total cholesterol 137, HDL 45, LDL 41 and triglycerides 257.      PHYSICAL EXAMINATION:    VITAL SIGNS:  Current blood pressure 120/80, pulse 82 and regular, weight 221 pounds, unchanged from 10/09/2018.  BMI is 42.  Chest is clear to auscultation.   CARDIAC:  Regular rate and rhythm, normal S1 and S2 with an S4 gallop but no S3 or murmur.  No JVD.  Pulses were all intact without bruits.     ABDOMEN:   Obese, soft, nontender without organomegaly.   EXTREMITIES:  Without cyanosis, clubbing or edema.      ASSESSMENT:   1.  Tania Azevedo is a delightful 58-year-old female status post inferior wall myocardial infarction in  2015 with an ejection fraction initially of 40%-45%.  Stress echocardiogram 2016 demonstrated no ongoing ischemia with mild inferior wall hypokinesis not changed with exercise.  Her ejection fraction was 55%-60%.  We have again had a long discussion regarding the need for an exercise program and weight loss.  We will consider a stress echocardiogram in 2020.   2.  Morbid obesity.  We discussed weight loss and exercise at length.   3.  Systolic hypertension currently under excellent control on her current medications.   4.  Possible sleep apnea.  It is not clear to me this patient has been evaluated for sleep apnea and this is something I believe she should consider.  This can be done through her primary care physician.   5.  Hyperglycemia.  This patient likely has insulin resistance based on her weight.  For now diet and exercise would suffice to correct her mixed hyperlipidemia as well.   6.  The patient is driving a minivan.  I believe from a cardiovascular standpoint she is safe to do so and I see no restrictions on her driving from a cardiovascular standpoint.  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      The patient's medications have not changed and are stable as is her blood pressure and pulse rate.      It is my pleasure to assist in the care of Tania Araujo.  All her questions were answered to her satisfaction.      Cyndi Lawson MD      cc:   Judah Alexandre DO    [facility] [place] [facility] [address] [place] [address]         CYNDI LAWSON MD, [facility]             D: 2019   T: 2019   MT: KIM      Name:     TANIA ARAUJO   MRN:      [id number]        Account:      [id number]   :      1960           Service Date: 2019      Document: [id number]

## 2019-03-05 NOTE — LETTER
3/5/2019      Judah Alexandre,   [facility] [address]  [facility] [address]      RE: Taniakermit Duffon       Dear Colleague,    I had the pleasure of seeing Tania Azevedo in the [facility].    Service Date: 03/05/2019      PRIMARY CARE PHYSICIAN:  Dr. Judah Alexandre.      HISTORY OF PRESENT ILLNESS:  I again had the pleasure of seeing your patient, Tania Azevedo, at [facility] for evaluation of coronary artery disease, hypertension and mixed hyperlipidemia.  The patient drives a minivan for work.  She is a 58-year-old female status post non-ST elevation myocardial infarction in 02/2015.  We found a total occlusion of her right coronary artery with mild to moderate disease in her other vessels.  Intracoronary stenting and thrombectomy was performed on the right coronary artery.  She remains free of angina or significant shortness of breath.  She moved to [place] to live with her daughter.  She does not exercise over the last several months after a viral illness.  Previously she was beginning to exercise on a treadmill at 1 mile 3 times a week.  She is a nonsmoker since the time of her myocardial infarction and stenting.  She does not drink alcohol.  She denies significant dyspnea on exertion, chest discomfort, palpitations, syncope or presyncope.  She has difficulty losing weight because of excess calories and portion sizes.  She denies myalgias or myopathy.  Hydrochlorothiazide was added for hypertension.  Her most recent fasting lipid profile from 10/03/2018 includes total cholesterol 137, HDL 45, LDL 41 and triglycerides 257.      PHYSICAL EXAMINATION:    VITAL SIGNS:  Current blood pressure 120/80, pulse 82 and regular, weight 221 pounds, unchanged from 10/09/2018.  BMI is 42.  Chest is clear to auscultation.   CARDIAC:  Regular rate and rhythm, normal S1 and S2 with an S4 gallop but no S3 or murmur.  No JVD.  Pulses were all intact without bruits.      ABDOMEN:   Obese, soft, nontender without organomegaly.   EXTREMITIES:  Without cyanosis, clubbing or edema.      ASSESSMENT:   1.  Tania Araujo is a delightful 58-year-old female status post inferior wall myocardial infarction in 2015 with an ejection fraction initially of 40%-45%.  Stress echocardiogram 2016 demonstrated no ongoing ischemia with mild inferior wall hypokinesis not changed with exercise.  Her ejection fraction was 55%-60%.  We have again had a long discussion regarding the need for an exercise program and weight loss.  We will consider a stress echocardiogram in .   2.  Morbid obesity.  We discussed weight loss and exercise at length.   3.  Systolic hypertension currently under excellent control on her current medications.   4.  Possible sleep apnea.  It is not clear to me this patient has been evaluated for sleep apnea and this is something I believe she should consider.  This can be done through her primary care physician.   5.  Hyperglycemia.  This patient likely has insulin resistance based on her weight.  For now diet and exercise would suffice to correct her mixed hyperlipidemia as well.   6.  The patient is driving a minivan.  I believe from a cardiovascular standpoint she is safe to do so and I see no restrictions on her driving from a cardiovascular standpoint.  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      The patient's medications have not changed and are stable as is her blood pressure and pulse rate.      It is my pleasure to assist in the care of Tania Araujo.  All her questions were answered to her satisfaction.      Cyndi Lawson MD      cc:   Judah Alexandre DO    [facility] [place] [facility] [address] [place] [address]         CYNDI LAWSON MD, FACC             D: 2019   T: 2019   MT: KIM      Name:     TANIA ARAUJO   MRN:      [id number]        Account:      [id number]   :      1960           Service  Date: 03/05/2019      Document: [id number]         Outpatient Encounter Medications as of 3/5/2019   Medication Sig Dispense Refill     albuterol (PROVENTIL HFA: VENTOLIN HFA) 108 (90 BASE) MCG/ACT inhaler Inhale 2 puffs into the lungs every 6 hours. 1 Inhaler 2     aspirin 81 MG chewable tablet Take 1 tablet (81 mg) by mouth daily 30 tablet 0     atorvastatin (LIPITOR) 40 MG tablet Take 1 tablet (40 mg) by mouth daily 90 tablet 3     cetirizine (ZYRTEC) 10 MG tablet Take 10 mg by mouth daily.       FLONASE INHA 50 MCG/DOSE NA INHALE 2 SPRAYS IN EACH NOSTRIL ONCE DAILY (Patient taking differently: INHALE 2 SPRAYS IN EACH NOSTRIL ONCE DAILY PRN) 3 MONTHS 1 YEAR     FLUCONAZOLE PO Take 100 mg by mouth 2 tablets / as directed       hydrochlorothiazide (HYDRODIURIL) 25 MG tablet Take 0.5 tablets (12.5 mg) by mouth daily 45 tablet 2     irbesartan (AVAPRO) 150 MG tablet Take 1 tablet (150 mg) by mouth daily 90 tablet 3     metoprolol tartrate (LOPRESSOR) 25 MG tablet Take 1 tablet (25 mg) by mouth 2 times daily 180 tablet 3     nitroglycerin (NITROSTAT) 0.4 MG sublingual tablet Place 1 tablet (0.4 mg) under the tongue every 5 minutes as needed for chest pain 25 tablet 3     pantoprazole (PROTONIX) 40 MG EC tablet Take 1 tablet (40 mg) by mouth daily 90 tablet 3     sertraline (ZOLOFT) 50 MG tablet Take 0.5 tablets (25 mg) by mouth daily 15 tablet 1     [DISCONTINUED] atorvastatin (LIPITOR) 40 MG tablet Take 1 tablet (40 mg) by mouth daily 90 tablet 0     [DISCONTINUED] irbesartan (AVAPRO) 300 MG tablet Take 0.5 tablets (150 mg) by mouth daily 45 tablet 0     [DISCONTINUED] oseltamivir (TAMIFLU) 75 MG capsule Take 1 capsule (75 mg) by mouth 2 times daily 10 capsule 0     No facility-administered encounter medications on file as of 3/5/2019.        Again, thank you for allowing me to participate in the care of your patient.      Sincerely,    Dominick Huffman MD     [facility]

## 2019-03-05 NOTE — LETTER
3/5/2019    Judah Alexandre DO  [facility] [address]  [facility] [address]    RE: Tania Azevedo       Dear Colleague,    I had the pleasure of seeing Tania Azevedo in the [facility].    HPI and Plan:   See dictation:[id number]    Orders Placed This Encounter   Procedures     Lipid Profile     Follow-Up with Cardiologist       Orders Placed This Encounter   Medications     irbesartan (AVAPRO) 150 MG tablet     Sig: Take 1 tablet (150 mg) by mouth daily     Dispense:  90 tablet     Refill:  3     atorvastatin (LIPITOR) 40 MG tablet     Sig: Take 1 tablet (40 mg) by mouth daily     Dispense:  90 tablet     Refill:  3       Medications Discontinued During This Encounter   Medication Reason     oseltamivir (TAMIFLU) 75 MG capsule Therapy completed     irbesartan (AVAPRO) 300 MG tablet Reorder     atorvastatin (LIPITOR) 40 MG tablet Reorder         Encounter Diagnoses   Name Primary?     Coronary artery disease involving native coronary artery of native heart without angina pectoris Yes     NSTEMI (non-ST elevated myocardial infarction) (H)      Benign essential hypertension      Mixed hyperlipidemia        CURRENT MEDICATIONS:  Current Outpatient Medications   Medication Sig Dispense Refill     albuterol (PROVENTIL HFA: VENTOLIN HFA) 108 (90 BASE) MCG/ACT inhaler Inhale 2 puffs into the lungs every 6 hours. 1 Inhaler 2     aspirin 81 MG chewable tablet Take 1 tablet (81 mg) by mouth daily 30 tablet 0     atorvastatin (LIPITOR) 40 MG tablet Take 1 tablet (40 mg) by mouth daily 90 tablet 3     cetirizine (ZYRTEC) 10 MG tablet Take 10 mg by mouth daily.       FLONASE INHA 50 MCG/DOSE NA INHALE 2 SPRAYS IN EACH NOSTRIL ONCE DAILY (Patient taking differently: INHALE 2 SPRAYS IN EACH NOSTRIL ONCE DAILY PRN) 3 MONTHS 1 YEAR     FLUCONAZOLE PO Take 100 mg by mouth 2 tablets / as directed       hydrochlorothiazide (HYDRODIURIL) 25 MG tablet Take 0.5 tablets (12.5 mg) by mouth daily 45 tablet  2     irbesartan (AVAPRO) 150 MG tablet Take 1 tablet (150 mg) by mouth daily 90 tablet 3     metoprolol tartrate (LOPRESSOR) 25 MG tablet Take 1 tablet (25 mg) by mouth 2 times daily 180 tablet 3     nitroglycerin (NITROSTAT) 0.4 MG sublingual tablet Place 1 tablet (0.4 mg) under the tongue every 5 minutes as needed for chest pain 25 tablet 3     pantoprazole (PROTONIX) 40 MG EC tablet Take 1 tablet (40 mg) by mouth daily 90 tablet 3     sertraline (ZOLOFT) 50 MG tablet Take 0.5 tablets (25 mg) by mouth daily 15 tablet 1       ALLERGIES     Allergies   Allergen Reactions     Codeine        PAST MEDICAL HISTORY:  Past Medical History:   Diagnosis Date     CAD (coronary artery disease)     stent 2/9/15     Depression      Dyslipidemia      Hypertension      NSTEMI (non-ST elevated myocardial infarction) (H) 02-06-15     Tobacco abuse        PAST SURGICAL HISTORY:  Past Surgical History:   Procedure Laterality Date     C ANESTH,RADICAL HYSTERECTOMY       COLECTOMY RIGHT Right 6/3/2016    Procedure: COLECTOMY RIGHT;  Surgeon: Dillan Vaughn MD;  Location: [facility] OR     HEART CATH LEFT HEART CATH  02-09-15    SHE to RCA, Aspiration thrombectomy of RCA, 20-30% proximal LAD narrowing, 30% mid LAD, 40% mid first diagonal, prox RCA was occluded with 30% in distal RCA      HEART CATH, ANGIOPLASTY       LAPAROSCOPIC APPENDECTOMY N/A 6/3/2016    Procedure: LAPAROSCOPIC APPENDECTOMY;  Surgeon: Dillan Vaughn MD;  Location: [facility] OR       FAMILY HISTORY:  Family History   Problem Relation Age of Onset     Unknown/Adopted Mother      Unknown/Adopted Father        SOCIAL HISTORY:  Social History     Socioeconomic History     Marital status: Single     Spouse name: None     Number of children: None     Years of education: None     Highest education level: None   Occupational History     None   Social Needs     Financial resource strain: None     Food insecurity:     Worry: None     Inability: None     Transportation  needs:     Medical: None     Non-medical: None   Tobacco Use     Smoking status: Former Smoker     Packs/day: 1.00     Types: Cigarettes     Start date:      Last attempt to quit:      Years since quittin.1     Smokeless tobacco: Never Used     Tobacco comment: 2/6/15 - quit e cig   Substance and Sexual Activity     Alcohol use: No     Alcohol/week: 0.0 oz     Drug use: No     Sexual activity: Yes     Partners: Male     Birth control/protection: Surgical   Lifestyle     Physical activity:     Days per week: None     Minutes per session: None     Stress: None   Relationships     Social connections:     Talks on phone: None     Gets together: None     Attends Rastafarian service: None     Active member of club or organization: None     Attends meetings of clubs or organizations: None     Relationship status: None     Intimate partner violence:     Fear of current or ex partner: None     Emotionally abused: None     Physically abused: None     Forced sexual activity: None   Other Topics Concern     Parent/sibling w/ CABG, MI or angioplasty before 65F 55M? Not Asked      Service Not Asked     Blood Transfusions Not Asked     Caffeine Concern Yes     Comment: 1 can of diet soda and once in a while a coffee      Occupational Exposure Not Asked     Hobby Hazards Not Asked     Sleep Concern Yes     Comment: not lately     Stress Concern Yes     Comment: Significant other - Terminal Cancer     Weight Concern Not Asked     Special Diet No     Back Care Not Asked     Exercise Yes     Comment: some 2 days per week      Bike Helmet Not Asked     Seat Belt Yes     Self-Exams Not Asked   Social History Narrative     None       Review of Systems:  Skin:  Negative       Eyes:  Positive for glasses    ENT:  Negative      Respiratory:  Negative       Cardiovascular:  Negative      Gastroenterology: Negative      Genitourinary:  not assessed      Musculoskeletal:  Positive for joint pain(hips) (toe  "cramps)  Neurologic:  Negative      Psychiatric:  Positive for excessive stress;depression;anxiety    Heme/Lymph/Imm:  Positive for allergies;hay fever    Endocrine:  Negative        Physical Exam:  Vitals: /80   Pulse 82   Ht 1.549 m (5' 1\")   Wt 100.2 kg (221 lb)   BMI 41.76 kg/m       Constitutional:  cooperative, alert and oriented, well developed, well nourished, in no acute distress obese      Skin:  warm and dry to the touch, no apparent skin lesions or masses noted          Head:  normocephalic, no masses or lesions        Eyes:  pupils equal and round, conjunctivae and lids unremarkable, sclera white, no xanthalasma, EOMS intact, no nystagmus        Lymph:      ENT:  no pallor or cyanosis, dentition good        Neck:  carotid pulses are full and equal bilaterally, JVP normal, no carotid bruit        Respiratory:  normal breath sounds, clear to auscultation, normal A-P diameter, normal symmetry, normal respiratory excursion, no use of accessory muscles         Cardiac: regular rhythm;normal S1 and S2;apical impulse not displaced   S4 no presence of murmur       right radial site without hematoma or bruit  pulses full and equal, no bruits auscultated                                        GI:  abdomen soft, non-tender, BS normoactive, no mass, no HSM, no bruits        Extremities and Muscular Skeletal:  no deformities, clubbing, cyanosis, erythema observed;no edema              Neurological:  no gross motor deficits;affect appropriate        Psych:  Alert and Oriented x 3        CC  Dominick Huffman MD  [address]  [place] [address]                Thank you for allowing me to participate in the care of your patient.      Sincerely,     Dominick Huffman MD     [facility]    cc:   Dominick Huffman MD  [address]  [place] [id number]        "

## 2019-04-03 ENCOUNTER — TELEPHONE (OUTPATIENT)
Dept: CARDIOLOGY | Facility: CLINIC | Age: 59
End: 2019-04-03

## 2019-04-03 NOTE — TELEPHONE ENCOUNTER
Patient called requesting that Dr. Huffman's OV note stating she is ok to drive from a cardiac standpoint to be faxed to [facility] at [phone]. OV note faxed. Pt has team 4 direct number to call with they need anything else.

## 2019-08-19 DIAGNOSIS — I10 ESSENTIAL HYPERTENSION: ICD-10-CM

## 2019-08-19 RX ORDER — HYDROCHLOROTHIAZIDE 25 MG/1
12.5 TABLET ORAL DAILY
Qty: 45 TABLET | Refills: 2 | Status: SHIPPED | OUTPATIENT
Start: 2019-08-19 | End: 2020-02-28

## 2019-08-19 NOTE — TELEPHONE ENCOUNTER
Medication Refilled: hydrochlorothiazide   Last office visit: 3/5/19  Last Labs/EKG: 3/5/19  Next office visit: 3/2020  Pharmacy sent to: [facility].   DWAYNE Dodson RN

## 2019-08-23 ENCOUNTER — TELEPHONE (OUTPATIENT)
Dept: CARDIOLOGY | Facility: CLINIC | Age: 59
End: 2019-08-23

## 2019-08-23 NOTE — TELEPHONE ENCOUNTER
Patient called inquiring if it is ok with Dr. Huffman that she start taking turmeric 1500 mg daily to help with joint pain. Patient states that she has had a lot of hip and knee pain lately, and some of her coworkers are taking turmeric and state that it has helped their joint pain. Patient wondering if it is ok from a cardiac and medication standpoint that she start taking it.     Last OV 3/5/19 with Dr. Huffman:    ASSESSMENT:   1.  Tania Azevedo is a delightful 58-year-old female status post inferior wall myocardial infarction in 02/2015 with an ejection fraction initially of 40%-45%.  Stress echocardiogram 02/26/2016 demonstrated no ongoing ischemia with mild inferior wall hypokinesis not changed with exercise.  Her ejection fraction was 55%-60%.  We have again had a long discussion regarding the need for an exercise program and weight loss.  We will consider a stress echocardiogram in 2020.   2.  Morbid obesity.  We discussed weight loss and exercise at length.   3.  Systolic hypertension currently under excellent control on her current medications.   4.  Possible sleep apnea.  It is not clear to me this patient has been evaluated for sleep apnea and this is something I believe she should consider.  This can be done through her primary care physician.   5.  Hyperglycemia.  This patient likely has insulin resistance based on her weight.  For now diet and exercise would suffice to correct her mixed hyperlipidemia as well.   6.  The patient is driving a minivan.  I believe from a cardiovascular standpoint she is safe to do so and I see no restrictions on her driving from a cardiovascular standpoint.  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      The patient's medications have not changed and are stable as is her blood pressure and pulse rate.      It is my pleasure to assist in the care of Tania Azevedo.  All her questions were answered to her satisfaction.      Dominick Huffman MD     Will route to  Dr. Hfufman for review.

## 2019-08-26 NOTE — TELEPHONE ENCOUNTER
Tumeric can increase the risk of bleeding with aspirin.  It may also reduce the benefit of pantoprazole.  No other known negative cardiac side effects from this OTC medication.  Dominick Huffman MD, FACC  August 25, 2019 10:19 PM

## 2019-08-26 NOTE — TELEPHONE ENCOUNTER
Spoke to patient and informed her of Dr. Huffman's response below. Pt verbalized understanding. Pt did mention that when she was at the chiropractor last week her BP was about 140/80 but she was in intense pain at the time and is wondering if that played a role. Informed patient that it definitely could be from the pain. Recommended patient continue to watch her BP and if it remains high when she has pain control to call team 4 back. Pt agreed to plan.

## 2019-12-26 DIAGNOSIS — I21.4 NSTEMI (NON-ST ELEVATED MYOCARDIAL INFARCTION) (H): ICD-10-CM

## 2019-12-26 RX ORDER — METOPROLOL TARTRATE 25 MG/1
25 TABLET, FILM COATED ORAL 2 TIMES DAILY
Qty: 180 TABLET | Refills: 0 | Status: SHIPPED | OUTPATIENT
Start: 2019-12-26 | End: 2020-03-03

## 2019-12-26 NOTE — TELEPHONE ENCOUNTER
Received refill request for:  Metoprolol tartrate  Last OV was: 3/5/2019 with Dr. Huffman  Labs/EKG: n/a  F/U scheduled: orders in [facility] for 3/2020, not yet scheduled  New script sent to: [facility]

## 2019-12-30 ENCOUNTER — TELEPHONE (OUTPATIENT)
Dept: CARDIOLOGY | Facility: CLINIC | Age: 59
End: 2019-12-30

## 2019-12-30 NOTE — TELEPHONE ENCOUNTER
VENKAT from patient, requesting a refill of her metoprolol. Patient's metoprolol was refilled last week. Spoke with patient and reviewed that a refill has been sent in for her metoprolol. Also informed patient that she will be due for annual follow up in March and that she should get on the schedule. OV made for 2/14/19 with Dr. Huffman at 1:45PM in [facility].

## 2020-01-08 ENCOUNTER — TELEPHONE (OUTPATIENT)
Dept: CARDIOLOGY | Facility: CLINIC | Age: 60
End: 2020-01-08

## 2020-01-08 NOTE — TELEPHONE ENCOUNTER
VENKAT from patient, calling to inquire if she needs any labs done prior to her OV with Dr. Huffman. Reviewed chart. Patient had order to complete labs in October 2019, but never got them done. Spoke with patient. Reviewed that she is due to lipid profile. Patient verbalized understanding and agreed with plan of care. Lab appointment made prior to OV with Dr. Huffman on 2/14/20.

## 2020-02-11 DIAGNOSIS — I21.4 NSTEMI (NON-ST ELEVATED MYOCARDIAL INFARCTION) (H): ICD-10-CM

## 2020-02-11 RX ORDER — ATORVASTATIN CALCIUM 40 MG/1
40 TABLET, FILM COATED ORAL DAILY
Qty: 90 TABLET | Refills: 0 | Status: SHIPPED | OUTPATIENT
Start: 2020-02-11 | End: 2020-02-28

## 2020-02-11 NOTE — TELEPHONE ENCOUNTER
Medication Refilled: atorvastatin   Last office visit: 3/5/19 w/ Dr. Huffman  Last Labs/EKG: 10/3/18  Next office visit: Not scheduled. Was originally scheduled 2/14/20 but orphaned. Needs labs and establish w/ new provider or see SANDEEP. Letter sent.   Pharmacy sent to: Faiza King RN

## 2020-02-11 NOTE — LETTER
February 11, 2020       TO: Tania Azevedo  [address]  [place] [address]       Dear Tania Azevedo,    You have requested a medication refill and our records indicate that you have not been seen by one of our providers for your annual office visit.  We will refill your medication for 3 months, which will allow you enough time to be seen by one of our providers.    Please call our clinic at [phone] to schedule your appointment as soon as possible.    Thank you,    [facility]

## 2020-02-28 ENCOUNTER — OFFICE VISIT (OUTPATIENT)
Dept: CARDIOLOGY | Facility: CLINIC | Age: 60
End: 2020-02-28
Payer: COMMERCIAL

## 2020-02-28 ENCOUNTER — HOSPITAL ENCOUNTER (OUTPATIENT)
Dept: CARDIOLOGY | Facility: CLINIC | Age: 60
Discharge: HOME | End: 2020-02-28
Attending: INTERNAL MEDICINE | Admitting: INTERNAL MEDICINE
Payer: COMMERCIAL

## 2020-02-28 ENCOUNTER — TELEPHONE (OUTPATIENT)
Dept: CARDIOLOGY | Facility: CLINIC | Age: 60
End: 2020-02-28

## 2020-02-28 VITALS
HEIGHT: 61 IN | HEART RATE: 60 BPM | SYSTOLIC BLOOD PRESSURE: 126 MMHG | WEIGHT: 224 LBS | BODY MASS INDEX: 42.29 KG/M2 | DIASTOLIC BLOOD PRESSURE: 81 MMHG

## 2020-02-28 VITALS
SYSTOLIC BLOOD PRESSURE: 120 MMHG | HEART RATE: 67 BPM | DIASTOLIC BLOOD PRESSURE: 68 MMHG | OXYGEN SATURATION: 98 % | WEIGHT: 220.6 LBS | HEIGHT: 63 IN | BODY MASS INDEX: 39.09 KG/M2

## 2020-02-28 DIAGNOSIS — I10 ESSENTIAL HYPERTENSION: ICD-10-CM

## 2020-02-28 DIAGNOSIS — K21.9 GASTROESOPHAGEAL REFLUX DISEASE WITHOUT ESOPHAGITIS: ICD-10-CM

## 2020-02-28 DIAGNOSIS — I10 BENIGN ESSENTIAL HYPERTENSION: ICD-10-CM

## 2020-02-28 DIAGNOSIS — I21.4 NSTEMI (NON-ST ELEVATED MYOCARDIAL INFARCTION) (H): ICD-10-CM

## 2020-02-28 DIAGNOSIS — E78.2 MIXED HYPERLIPIDEMIA: ICD-10-CM

## 2020-02-28 DIAGNOSIS — I25.10 CORONARY ARTERY DISEASE INVOLVING NATIVE CORONARY ARTERY OF NATIVE HEART WITHOUT ANGINA PECTORIS: ICD-10-CM

## 2020-02-28 DIAGNOSIS — F41.9 ANXIETY: ICD-10-CM

## 2020-02-28 DIAGNOSIS — I25.10 CORONARY ARTERY DISEASE INVOLVING NATIVE CORONARY ARTERY OF NATIVE HEART WITHOUT ANGINA PECTORIS: Primary | ICD-10-CM

## 2020-02-28 LAB
CHOLEST SERPL-MCNC: 133 MG/DL
CV STRESS MAX HR HE: 151
HDLC SERPL-MCNC: 49 MG/DL
LDLC SERPL CALC-MCNC: 40 MG/DL
NONHDLC SERPL-MCNC: 84 MG/DL
RATE PRESSURE PRODUCT: NORMAL
STRESS ANGINA INDEX: 0
STRESS ECHO BASELINE DIASTOLIC HE: 70
STRESS ECHO BASELINE HR: 74
STRESS ECHO BASELINE SYSTOLIC BP: 120
STRESS ECHO CALCULATED PERCENT HR: 94 %
STRESS ECHO LAST STRESS DIASTOLIC BP: 62
STRESS ECHO LAST STRESS SYSTOLIC BP: 150
STRESS ECHO POST ESTIMATED WORKLOAD: 7 METS
STRESS ECHO POST EXERCISE DUR MIN: 6 MIN
STRESS ECHO POST EXERCISE DUR SEC: 0 SEC
STRESS ECHO TARGET HR: 160
TRIGL SERPL-MCNC: 220 MG/DL

## 2020-02-28 PROCEDURE — 93000 ELECTROCARDIOGRAM COMPLETE: CPT | Performed by: INTERNAL MEDICINE

## 2020-02-28 PROCEDURE — 34300033 ZZH RX 343: Performed by: INTERNAL MEDICINE

## 2020-02-28 PROCEDURE — 78452 HT MUSCLE IMAGE SPECT MULT: CPT | Mod: 26 | Performed by: INTERNAL MEDICINE

## 2020-02-28 PROCEDURE — 80061 LIPID PANEL: CPT | Performed by: INTERNAL MEDICINE

## 2020-02-28 PROCEDURE — 93017 CV STRESS TEST TRACING ONLY: CPT

## 2020-02-28 PROCEDURE — 99214 OFFICE O/P EST MOD 30 MIN: CPT | Mod: 25 | Performed by: INTERNAL MEDICINE

## 2020-02-28 PROCEDURE — 93016 CV STRESS TEST SUPVJ ONLY: CPT | Performed by: INTERNAL MEDICINE

## 2020-02-28 PROCEDURE — A9502 TC99M TETROFOSMIN: HCPCS | Performed by: INTERNAL MEDICINE

## 2020-02-28 PROCEDURE — 36415 COLL VENOUS BLD VENIPUNCTURE: CPT | Performed by: INTERNAL MEDICINE

## 2020-02-28 PROCEDURE — 93018 CV STRESS TEST I&R ONLY: CPT | Performed by: INTERNAL MEDICINE

## 2020-02-28 RX ORDER — IRBESARTAN 150 MG/1
150 TABLET ORAL DAILY
Qty: 90 TABLET | Refills: 3 | Status: SHIPPED | OUTPATIENT
Start: 2020-02-28 | End: 2021-03-08

## 2020-02-28 RX ORDER — ATORVASTATIN CALCIUM 40 MG/1
40 TABLET, FILM COATED ORAL DAILY
Qty: 90 TABLET | Refills: 3 | Status: SHIPPED | OUTPATIENT
Start: 2020-02-28 | End: 2021-03-08

## 2020-02-28 RX ORDER — PANTOPRAZOLE SODIUM 40 MG/1
40 TABLET, DELAYED RELEASE ORAL DAILY
Qty: 90 TABLET | Refills: 3 | Status: SHIPPED | OUTPATIENT
Start: 2020-02-28 | End: 2021-03-08

## 2020-02-28 RX ORDER — HYDROCHLOROTHIAZIDE 25 MG/1
12.5 TABLET ORAL DAILY
Qty: 45 TABLET | Refills: 2 | Status: SHIPPED | OUTPATIENT
Start: 2020-02-28 | End: 2020-04-22

## 2020-02-28 RX ORDER — NITROGLYCERIN 0.4 MG/1
0.4 TABLET SUBLINGUAL EVERY 5 MIN PRN
Qty: 25 TABLET | Refills: 3 | Status: SHIPPED | OUTPATIENT
Start: 2020-02-28 | End: 2021-03-08

## 2020-02-28 RX ADMIN — TETROFOSMIN 30.8 MCI.: 1.38 INJECTION, POWDER, LYOPHILIZED, FOR SOLUTION INTRAVENOUS at 14:37

## 2020-02-28 RX ADMIN — TETROFOSMIN 10.03 MCI.: 1.38 INJECTION, POWDER, LYOPHILIZED, FOR SOLUTION INTRAVENOUS at 13:14

## 2020-02-28 ASSESSMENT — MIFFLIN-ST. JEOR
SCORE: 1539.77
SCORE: 1523.44

## 2020-02-28 NOTE — LETTER
2/28/2020    Judah Alexandre DO  [facility] [address]  [facility] [address]    RE: Tania PAOLO Azevedo       Dear Colleague,    I had the pleasure of seeing Tania Azevedo in the [facility].    HPI and Plan:   Today I had the pleasure of seeing Tania Azevedo at [facility]. She is a pleasant 59 year old patient with a past medical history of inferior myocardial infarction status post PCI in 2015, also noted to have moderate disease in LAD and D1, former smoker, ischemic cardiomyopathy with ejection fraction of 45-50%, hypertension, hyperlipidemia and metabolic syndrome who presents to the clinic for a follow-up appointment.  The patient was previously seen by 1 of my partners who is currently unable unavailable to see her.  He had recommended a stress test in 2020.    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  She has DOT appointment in April.  She lives a very sedentary life and does not walk regularly for exercise.  However, her work involves lifting and carrying equipment like wheelchair and passengers in and out of the bus.  When I asked her what was the most strenuous activity she had recently done she tells me that she cannot think of any.  Blood work today showed LDL cholesterol 40 mg/dL, triglyceride of 220 mg/dL and total cholesterol of 133 mg/dL.  Triglycerides is down from 257 mg/DL. She currently takes 40 mg of Lipitor.  Her other medications include low-dose aspirin, beta-blocker and angiotensin receptor blocker.    Assessment and plan  1.  Inferior myocardial infarction status post PCI in 2015  2.  Mild-moderate disease in LAD and D1, and distal RCA, 30 to 40%.  3.  Hypertension  4.  Hyperlipidemia  5.  Obesity    The patient does not exercise or do any sort of physical exertion.  She also has mild to moderate disease in the distal RCA and LAD territory during previous coronary angiogram in 2015.  I will perform a exercise nuclear  stress test to assess the level of physical exertion she can tolerate and rule out ischemia.  If negative I do not see any cardiac reasons that would prohibit her from doing her job.  I will have her come back and see me in a year or sooner if needed.  I will continue all her medications at the current dose for now.  In the future I will consider adding fiber to her medical regimen.    Plan  1.  Perform nuclear exercise stress test today  2.  Continue all other medications at the current dose.  Medically  Dietary choices, exercise regularly.  3.  Follow-up with me in a year or sooner if needed    Thank you for allowing me to participate in the care of Tania Azevedo    This note was completed in part using Dragon voice recognition software. Although reviewed after completion, some word and grammatical errors may occur.    Aime Muniz MD  Cardiology    Orders Placed This Encounter   Procedures     EKG 12-lead complete w/read - Clinics (performed today)       No orders of the defined types were placed in this encounter.      There are no discontinued medications.    Encounter Diagnoses   Name Primary?     Coronary artery disease involving native coronary artery of native heart without angina pectoris Yes     Benign essential hypertension      Essential hypertension      NSTEMI (non-ST elevated myocardial infarction) (H)        CURRENT MEDICATIONS:  Current Outpatient Medications   Medication Sig Dispense Refill     albuterol (PROVENTIL HFA: VENTOLIN HFA) 108 (90 BASE) MCG/ACT inhaler Inhale 2 puffs into the lungs every 6 hours. 1 Inhaler 2     aspirin 81 MG chewable tablet Take 1 tablet (81 mg) by mouth daily 30 tablet 0     atorvastatin (LIPITOR) 40 MG tablet Take 1 tablet (40 mg) by mouth daily Follow-up with Heart Clinic for more refills. 90 tablet 0     cetirizine (ZYRTEC) 10 MG tablet Take 10 mg by mouth daily.       FLONASE INHA 50 MCG/DOSE NA INHALE 2 SPRAYS IN EACH NOSTRIL ONCE DAILY (Patient taking  differently: INHALE 2 SPRAYS IN EACH NOSTRIL ONCE DAILY PRN) 3 MONTHS 1 YEAR     FLUCONAZOLE PO Take 100 mg by mouth 2 tablets / as directed       hydrochlorothiazide (HYDRODIURIL) 25 MG tablet Take 0.5 tablets (12.5 mg) by mouth daily 45 tablet 2     irbesartan (AVAPRO) 150 MG tablet Take 1 tablet (150 mg) by mouth daily 90 tablet 3     metoprolol tartrate (LOPRESSOR) 25 MG tablet Take 1 tablet (25 mg) by mouth 2 times daily 180 tablet 0     nitroglycerin (NITROSTAT) 0.4 MG sublingual tablet Place 1 tablet (0.4 mg) under the tongue every 5 minutes as needed for chest pain 25 tablet 3     pantoprazole (PROTONIX) 40 MG EC tablet Take 1 tablet (40 mg) by mouth daily 90 tablet 3     sertraline (ZOLOFT) 50 MG tablet Take 0.5 tablets (25 mg) by mouth daily 15 tablet 1       ALLERGIES     Allergies   Allergen Reactions     Codeine        PAST MEDICAL HISTORY:  Past Medical History:   Diagnosis Date     CAD (coronary artery disease)     stent 2/9/15     Depression      Dyslipidemia      Hypertension      NSTEMI (non-ST elevated myocardial infarction) (H) 02-06-15     Tobacco abuse        PAST SURGICAL HISTORY:  Past Surgical History:   Procedure Laterality Date     C ANESTH,RADICAL HYSTERECTOMY       COLECTOMY RIGHT Right 6/3/2016    Procedure: COLECTOMY RIGHT;  Surgeon: Dillan Vaughn MD;  Location:  OR     HEART CATH LEFT HEART CATH  02-09-15    SHE to RCA, Aspiration thrombectomy of RCA, 20-30% proximal LAD narrowing, 30% mid LAD, 40% mid first diagonal, prox RCA was occluded with 30% in distal RCA      HEART CATH, ANGIOPLASTY       LAPAROSCOPIC APPENDECTOMY N/A 6/3/2016    Procedure: LAPAROSCOPIC APPENDECTOMY;  Surgeon: Dillan Vaughn MD;  Location: [facility] OR       FAMILY HISTORY:  Family History   Problem Relation Age of Onset     Unknown/Adopted Mother      Unknown/Adopted Father        SOCIAL HISTORY:  Social History     Socioeconomic History     Marital status: Single     Spouse name: None      Number of children: None     Years of education: None     Highest education level: None   Occupational History     None   Social Needs     Financial resource strain: None     Food insecurity:     Worry: None     Inability: None     Transportation needs:     Medical: None     Non-medical: None   Tobacco Use     Smoking status: Former Smoker     Packs/day: 1.00     Types: Cigarettes     Start date:      Last attempt to quit:      Years since quittin.1     Smokeless tobacco: Never Used     Tobacco comment: 2/6/15 - quit e cig   Substance and Sexual Activity     Alcohol use: No     Alcohol/week: 0.0 standard drinks     Drug use: No     Sexual activity: Yes     Partners: Male     Birth control/protection: Surgical   Lifestyle     Physical activity:     Days per week: None     Minutes per session: None     Stress: None   Relationships     Social connections:     Talks on phone: None     Gets together: None     Attends Buddhist service: None     Active member of club or organization: None     Attends meetings of clubs or organizations: None     Relationship status: None     Intimate partner violence:     Fear of current or ex partner: None     Emotionally abused: None     Physically abused: None     Forced sexual activity: None   Other Topics Concern     Parent/sibling w/ CABG, MI or angioplasty before 65F 55M? Not Asked      Service Not Asked     Blood Transfusions Not Asked     Caffeine Concern Yes     Comment: 1 can of diet soda and once in a while a coffee      Occupational Exposure Not Asked     Hobby Hazards Not Asked     Sleep Concern Yes     Comment: not lately     Stress Concern Yes     Comment: Significant other - Terminal Cancer     Weight Concern Not Asked     Special Diet No     Back Care Not Asked     Exercise Yes     Comment: some 2 days per week      Bike Helmet Not Asked     Seat Belt Yes     Self-Exams Not Asked   Social History Narrative     None       Review of Systems:  Skin:   "Negative       Eyes:  Positive for glasses    ENT:  Negative      Respiratory:  Positive for dyspnea on exertion     Cardiovascular:  Negative;palpitations;chest pain;lightheadedness;dizziness;syncope or near-syncope;cyanosis;fatigue;edema      Gastroenterology: Positive for heartburn;reflux    Genitourinary:  not assessed      Musculoskeletal:  Negative      Neurologic:  Negative      Psychiatric:  Negative      Heme/Lymph/Imm:  Positive for allergies    Endocrine:  Negative        Physical Exam:  Vitals: /81   Pulse 60   Ht 1.549 m (5' 1\")   Wt 101.6 kg (224 lb)   BMI 42.32 kg/m     Constitutional: awake, alert, no distress  Skin: Warm and dry to touch  Head: Normocephalic, atraumatic  Eyes: Conjunctivae and lids unremarkable, sclera white  ENT: No pallor or cyanosis  Respiratory: Normal breath sounds, clear to auscultation  Cardiac: Regular rate and rhythm, S1-S2 normal.  No murmurs gallops or rubs.   No pedal edema.   Extremities and musculoskeletal: No gross motor deficit  Neurological.  Affect normal  Psych: Alert and oriented x3    Recent Lab Results:  LIPID RESULTS:  Lab Results   Component Value Date    CHOL 133 02/28/2020    HDL 49 (L) 02/28/2020    LDL 40 02/28/2020    TRIG 220 (H) 02/28/2020    CHOLHDLRATIO 2.9 10/29/2015       LIVER ENZYME RESULTS:  Lab Results   Component Value Date    AST 43 06/03/2016    ALT 52 (H) 10/03/2018       CBC RESULTS:  Lab Results   Component Value Date    WBC 8.3 06/06/2016    RBC 3.33 (L) 06/06/2016    HGB 10.5 (L) 06/06/2016    HCT 30.5 (L) 06/06/2016    MCV 92 06/06/2016    MCH 31.5 06/06/2016    MCHC 34.4 06/06/2016    RDW 13.2 06/06/2016     06/06/2016       BMP RESULTS:  Lab Results   Component Value Date     10/03/2018    POTASSIUM 4.1 10/03/2018    CHLORIDE 101 10/03/2018    CO2 29 10/03/2018    ANIONGAP 7 10/03/2018     (H) 10/03/2018    BUN 19 10/03/2018    CR 0.72 10/03/2018    GFRESTIMATED 82 10/03/2018    GFRESTBLACK >90 " 10/03/2018    DEONNA 9.4 10/03/2018        A1C RESULTS:  No results found for: A1C    INR RESULTS:  No results found for: INR    CC  DO MICHELLE Sandoval [facility]  [address] [place] [address]    All medical records were reviewed in detail and discussed with the patient. Greater than 45 mins were spent with the patient, 50% of this time was spent on counseling and coordination of care.  After visit summary was printed and given to the patient.    Thank you for allowing me to participate in the care of your patient.      Sincerely,     Aime Muniz MD     [facility]

## 2020-02-28 NOTE — LETTER
LBE Security Master Customer Service  [facility]  [address], [address]  [place] [address]  Fax: [phone]  Phone: [phone]      2020      Tania Azevedo  [address]   [facility] [address]        Dear Tania,    Thank you for your interest in becoming a LBE Security Master user!    Your access code is: [email or web address]  Expires: 2020 10:00 AM     Please access the LBE Security Master website at [email or web address].  Below the ID and password fields, select the  Sign Up Now  as New User.  You will be prompted to enter the access code listed above as well as additional personal information.  Please follow the directions carefully when creating your username and password.    If you allow your access code to , or if you have any questions please call a LBE Security Master Representative at [phone] during normal clinic hours.     Sincerely,      LBE Security Master Customer Service  [facility]

## 2020-02-28 NOTE — PROGRESS NOTES
HPI and Plan:   Today I had the pleasure of seeing Tania Azevedo at [facility]. She is a pleasant 59 year old patient with a past medical history of inferior myocardial infarction status post PCI in 2015, also noted to have moderate disease in LAD and D1, former smoker, ischemic cardiomyopathy with ejection fraction of 45-50%, hypertension, hyperlipidemia and metabolic syndrome who presents to the clinic for a follow-up appointment.  The patient was previously seen by 1 of my partners who is currently unable unavailable to see her.  He had recommended a stress test in 2020.    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  She has DOT appointment in April.  She lives a very sedentary life and does not walk regularly for exercise.  However, her work involves lifting and carrying equipment like wheelchair and passengers in and out of the bus.  When I asked her what was the most strenuous activity she had recently done she tells me that she cannot think of any.  Blood work today showed LDL cholesterol 40 mg/dL, triglyceride of 220 mg/dL and total cholesterol of 133 mg/dL.  Triglycerides is down from 257 mg/DL. She currently takes 40 mg of Lipitor.  Her other medications include low-dose aspirin, beta-blocker and angiotensin receptor blocker.    Assessment and plan  1.  Inferior myocardial infarction status post PCI in 2015  2.  Mild-moderate disease in LAD and D1, and distal RCA, 30 to 40%.  3.  Hypertension  4.  Hyperlipidemia  5.  Obesity    The patient does not exercise or do any sort of physical exertion.  She also has mild to moderate disease in the distal RCA and LAD territory during previous coronary angiogram in 2015.  I will perform a exercise nuclear stress test to assess the level of physical exertion she can tolerate and rule out ischemia.  If negative I do not see any cardiac reasons that would prohibit her from doing her job.  I will have her come back and see me in a year  or sooner if needed.  I will continue all her medications at the current dose for now.  In the future I will consider adding fiber to her medical regimen.    Plan  1.  Perform nuclear exercise stress test today  2.  Continue all other medications at the current dose.  Medically  Dietary choices, exercise regularly.  3.  Follow-up with me in a year or sooner if needed    Thank you for allowing me to participate in the care of Tania Azevedo    This note was completed in part using Dragon voice recognition software. Although reviewed after completion, some word and grammatical errors may occur.    Aime Muniz MD  Cardiology    Orders Placed This Encounter   Procedures     EKG 12-lead complete w/read - Clinics (performed today)       No orders of the defined types were placed in this encounter.      There are no discontinued medications.    Encounter Diagnoses   Name Primary?     Coronary artery disease involving native coronary artery of native heart without angina pectoris Yes     Benign essential hypertension      Essential hypertension      NSTEMI (non-ST elevated myocardial infarction) (H)        CURRENT MEDICATIONS:  Current Outpatient Medications   Medication Sig Dispense Refill     albuterol (PROVENTIL HFA: VENTOLIN HFA) 108 (90 BASE) MCG/ACT inhaler Inhale 2 puffs into the lungs every 6 hours. 1 Inhaler 2     aspirin 81 MG chewable tablet Take 1 tablet (81 mg) by mouth daily 30 tablet 0     atorvastatin (LIPITOR) 40 MG tablet Take 1 tablet (40 mg) by mouth daily Follow-up with Heart Clinic for more refills. 90 tablet 0     cetirizine (ZYRTEC) 10 MG tablet Take 10 mg by mouth daily.       FLONASE INHA 50 MCG/DOSE NA INHALE 2 SPRAYS IN EACH NOSTRIL ONCE DAILY (Patient taking differently: INHALE 2 SPRAYS IN EACH NOSTRIL ONCE DAILY PRN) 3 MONTHS 1 YEAR     FLUCONAZOLE PO Take 100 mg by mouth 2 tablets / as directed       hydrochlorothiazide (HYDRODIURIL) 25 MG tablet Take 0.5 tablets (12.5 mg) by mouth daily 45  tablet 2     irbesartan (AVAPRO) 150 MG tablet Take 1 tablet (150 mg) by mouth daily 90 tablet 3     metoprolol tartrate (LOPRESSOR) 25 MG tablet Take 1 tablet (25 mg) by mouth 2 times daily 180 tablet 0     nitroglycerin (NITROSTAT) 0.4 MG sublingual tablet Place 1 tablet (0.4 mg) under the tongue every 5 minutes as needed for chest pain 25 tablet 3     pantoprazole (PROTONIX) 40 MG EC tablet Take 1 tablet (40 mg) by mouth daily 90 tablet 3     sertraline (ZOLOFT) 50 MG tablet Take 0.5 tablets (25 mg) by mouth daily 15 tablet 1       ALLERGIES     Allergies   Allergen Reactions     Codeine        PAST MEDICAL HISTORY:  Past Medical History:   Diagnosis Date     CAD (coronary artery disease)     stent 2/9/15     Depression      Dyslipidemia      Hypertension      NSTEMI (non-ST elevated myocardial infarction) (H) 02-06-15     Tobacco abuse        PAST SURGICAL HISTORY:  Past Surgical History:   Procedure Laterality Date     C ANESTH,RADICAL HYSTERECTOMY       COLECTOMY RIGHT Right 6/3/2016    Procedure: COLECTOMY RIGHT;  Surgeon: Dillan Vaughn MD;  Location: [facility] OR     HEART CATH LEFT HEART CATH  02-09-15    SHE to RCA, Aspiration thrombectomy of RCA, 20-30% proximal LAD narrowing, 30% mid LAD, 40% mid first diagonal, prox RCA was occluded with 30% in distal RCA      HEART CATH, ANGIOPLASTY       LAPAROSCOPIC APPENDECTOMY N/A 6/3/2016    Procedure: LAPAROSCOPIC APPENDECTOMY;  Surgeon: Dillan Vaughn MD;  Location: [facility] OR       FAMILY HISTORY:  Family History   Problem Relation Age of Onset     Unknown/Adopted Mother      Unknown/Adopted Father        SOCIAL HISTORY:  Social History     Socioeconomic History     Marital status: Single     Spouse name: None     Number of children: None     Years of education: None     Highest education level: None   Occupational History     None   Social Needs     Financial resource strain: None     Food insecurity:     Worry: None     Inability: None      Transportation needs:     Medical: None     Non-medical: None   Tobacco Use     Smoking status: Former Smoker     Packs/day: 1.00     Types: Cigarettes     Start date:      Last attempt to quit:      Years since quittin.1     Smokeless tobacco: Never Used     Tobacco comment: 2/6/15 - quit e cig   Substance and Sexual Activity     Alcohol use: No     Alcohol/week: 0.0 standard drinks     Drug use: No     Sexual activity: Yes     Partners: Male     Birth control/protection: Surgical   Lifestyle     Physical activity:     Days per week: None     Minutes per session: None     Stress: None   Relationships     Social connections:     Talks on phone: None     Gets together: None     Attends [employer]ist service: None     Active member of club or organization: None     Attends meetings of clubs or organizations: None     Relationship status: None     Intimate partner violence:     Fear of current or ex partner: None     Emotionally abused: None     Physically abused: None     Forced sexual activity: None   Other Topics Concern     Parent/sibling w/ CABG, MI or angioplasty before 65F 55M? Not Asked      Service Not Asked     Blood Transfusions Not Asked     Caffeine Concern Yes     Comment: 1 can of diet soda and once in a while a coffee      Occupational Exposure Not Asked     Hobby Hazards Not Asked     Sleep Concern Yes     Comment: not lately     Stress Concern Yes     Comment: Significant other - Terminal Cancer     Weight Concern Not Asked     Special Diet No     Back Care Not Asked     Exercise Yes     Comment: some 2 days per week      Bike Helmet Not Asked     Seat Belt Yes     Self-Exams Not Asked   Social History Narrative     None       Review of Systems:  Skin:  Negative       Eyes:  Positive for glasses    ENT:  Negative      Respiratory:  Positive for dyspnea on exertion     Cardiovascular:  Negative;palpitations;chest pain;lightheadedness;dizziness;syncope or  "near-syncope;cyanosis;fatigue;edema      Gastroenterology: Positive for heartburn;reflux    Genitourinary:  not assessed      Musculoskeletal:  Negative      Neurologic:  Negative      Psychiatric:  Negative      Heme/Lymph/Imm:  Positive for allergies    Endocrine:  Negative        Physical Exam:  Vitals: /81   Pulse 60   Ht 1.549 m (5' 1\")   Wt 101.6 kg (224 lb)   BMI 42.32 kg/m    Constitutional: awake, alert, no distress  Skin: Warm and dry to touch  Head: Normocephalic, atraumatic  Eyes: Conjunctivae and lids unremarkable, sclera white  ENT: No pallor or cyanosis  Respiratory: Normal breath sounds, clear to auscultation  Cardiac: Regular rate and rhythm, S1-S2 normal.  No murmurs gallops or rubs.   No pedal edema.   Extremities and musculoskeletal: No gross motor deficit  Neurological.  Affect normal  Psych: Alert and oriented x3    Recent Lab Results:  LIPID RESULTS:  Lab Results   Component Value Date    CHOL 133 02/28/2020    HDL 49 (L) 02/28/2020    LDL 40 02/28/2020    TRIG 220 (H) 02/28/2020    CHOLHDLRATIO 2.9 10/29/2015       LIVER ENZYME RESULTS:  Lab Results   Component Value Date    AST 43 06/03/2016    ALT 52 (H) 10/03/2018       CBC RESULTS:  Lab Results   Component Value Date    WBC 8.3 06/06/2016    RBC 3.33 (L) 06/06/2016    HGB 10.5 (L) 06/06/2016    HCT 30.5 (L) 06/06/2016    MCV 92 06/06/2016    MCH 31.5 06/06/2016    MCHC 34.4 06/06/2016    RDW 13.2 06/06/2016     06/06/2016       BMP RESULTS:  Lab Results   Component Value Date     10/03/2018    POTASSIUM 4.1 10/03/2018    CHLORIDE 101 10/03/2018    CO2 29 10/03/2018    ANIONGAP 7 10/03/2018     (H) 10/03/2018    BUN 19 10/03/2018    CR 0.72 10/03/2018    GFRESTIMATED 82 10/03/2018    GFRESTBLACK >90 10/03/2018    DEONNA 9.4 10/03/2018        A1C RESULTS:  No results found for: A1C    INR RESULTS:  No results found for: INR    CC  [facility]  [address] [place] [address]    All " medical records were reviewed in detail and discussed with the patient. Greater than 45 mins were spent with the patient, 50% of this time was spent on counseling and coordination of care.  After visit summary was printed and given to the patient.

## 2020-02-28 NOTE — TELEPHONE ENCOUNTER
Phone call to patient to inform her that her nuclear exercise study from today was reported as being normal though Dr. Muniz needs to review it yet. She was very pleased to hear the results. She is requesting that Dr. 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 I told her that we will have him do this early next week and mail it to her along with the stress test results.

## 2020-03-02 ENCOUNTER — TELEPHONE (OUTPATIENT)
Dept: CARDIOLOGY | Facility: CLINIC | Age: 60
End: 2020-03-02

## 2020-03-02 NOTE — TELEPHONE ENCOUNTER
Phone call to patient to inform her that Dr. Muniz signed a DOT letter and I placed this in a packet along with her stress test report and his clinic note from 2/28. She expressed appreciation for our efforts to do this.

## 2020-03-03 DIAGNOSIS — I21.4 NSTEMI (NON-ST ELEVATED MYOCARDIAL INFARCTION) (H): ICD-10-CM

## 2020-03-03 RX ORDER — METOPROLOL TARTRATE 25 MG/1
25 TABLET, FILM COATED ORAL 2 TIMES DAILY
Qty: 180 TABLET | Refills: 3 | Status: SHIPPED | OUTPATIENT
Start: 2020-03-03 | End: 2021-02-08

## 2020-03-03 NOTE — TELEPHONE ENCOUNTER
Medication Refilled: metoprolol  Last office visit: 2/28/2020 with Dr. Muniz  Last Labs/EKG: NA  Next office visit: 2/2021 orders in [facility]  Pharmacy sent to: Valdo Dodson RN

## 2020-04-22 DIAGNOSIS — I10 ESSENTIAL HYPERTENSION: ICD-10-CM

## 2020-04-22 RX ORDER — HYDROCHLOROTHIAZIDE 25 MG/1
12.5 TABLET ORAL DAILY
Qty: 45 TABLET | Refills: 2 | Status: SHIPPED | OUTPATIENT
Start: 2020-04-22 | End: 2021-03-08 | Stop reason: DRUGHIGH

## 2020-04-22 NOTE — TELEPHONE ENCOUNTER
Medication Refilled: hydrochlorothiazide   Last office visit: 2/28/2020 with Dr. Muniz  Last Labs/EKG: 10/3/2018 will need BMP at follow-up   Next office visit: 2/2021 orders in [facility]  Pharmacy sent to: Faiza Dodson RN

## 2020-11-22 ENCOUNTER — HEALTH MAINTENANCE LETTER (OUTPATIENT)
Age: 60
End: 2020-11-22

## 2021-02-08 DIAGNOSIS — I21.4 NSTEMI (NON-ST ELEVATED MYOCARDIAL INFARCTION) (H): ICD-10-CM

## 2021-02-08 RX ORDER — METOPROLOL TARTRATE 25 MG/1
25 TABLET, FILM COATED ORAL 2 TIMES DAILY
Qty: 180 TABLET | Refills: 0 | Status: SHIPPED | OUTPATIENT
Start: 2021-02-08 | End: 2021-05-10

## 2021-03-02 ENCOUNTER — VIRTUAL VISIT (OUTPATIENT)
Dept: CARDIOLOGY | Facility: CLINIC | Age: 61
End: 2021-03-02
Payer: COMMERCIAL

## 2021-03-02 DIAGNOSIS — I25.10 CORONARY ARTERY DISEASE INVOLVING NATIVE CORONARY ARTERY OF NATIVE HEART WITHOUT ANGINA PECTORIS: Primary | ICD-10-CM

## 2021-03-02 PROCEDURE — 99213 OFFICE O/P EST LOW 20 MIN: CPT | Mod: 95 | Performed by: INTERNAL MEDICINE

## 2021-03-02 NOTE — PROGRESS NOTES
"Tania is a 60 year old who is being evaluated via a billable video visit.      How would you like to obtain your AVS? MyChart  If the video visit is dropped, the invitation should be resent by: Send to e-mail at: [email or web address]  Will anyone else be joining your video visit? No      Vitals - Patient Reported 3/2/2021   Height (Patient Reported) 5' 1\"   Weight (Patient Reported) 220 lb   BMI (Based on Pt Reported Ht/Wt) 41.57 kg/m2     Review Of Systems  Skin: NEGATIVE  Eyes:Ears/Nose/Throat: NEGATIVE  Respiratory: NEGATIVE  Cardiovascular:NEGATIVE  Gastrointestinal: NEGATIVE  Genitourinary:NEGATIVE   Musculoskeletal: NEGATIVE  Neurologic: NEGATIVE  Psychiatric: NEGATIVE  Hematologic/Lymphatic/Immunologic: NEGATIVE  Endocrine:  NEGATIVE    Kisha NUGENT    Video Start Time: 4:50 PM  Video-Visit Details    Type of service:  Video Visit    Video End Time:4:50 PM    Originating Location (pt. Location): Home    Distant Location (provider location):  [facility]     Platform used for Video Visit: [employer]     This clinic visit was performed via telephone/video due to COVID-19 restrictions.    Today, I had the pleasure of connecting with Tania Azevedo for a follow-up visit.  She is a pleasant 60 year old patient with a past medical history of inferior myocardial infarction status post PCI in 2015, also noted to have moderate disease in LAD and D1, former smoker, ischemic cardiomyopathy with ejection fraction of 45-50%, hypertension, hyperlipidemia and metabolic syndrome who presents to the clinic for a follow-up appointment.       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  She has DOT appointment coming up and asked me for a letter. She lives a sedentary life and does not walk or exercise regularly.  However, her work involves lifting and carrying equipment like wheelchair and passengers in and out of the bus.   She reports feeling jittery in the morning but tells me that " because she drives in the morning she avoids taking her medications due to missed work. Blood work in the past has shown LDL at target levels. She takes 40 mg of Lipitor.   Her other medications include low-dose aspirin, beta-blocker and angiotensin receptor blocker.    Assessment and plan:   1.  Inferior myocardial infarction status post PCI in 2015  2.  Mild-moderate disease in LAD and D1, and distal RCA, 30 to 40%.  3.  Hypertension  4.  Hyperlipidemia  5.  Obesity     The patient does not exercise or do any sort of physical exertion.  She also has mild to moderate disease in the distal RCA and LAD territory during previous coronary angiogram in 2015.   Stress test performed at the time of last visit was negative for ischemia.  I do not see any cardiac reasons that would prohibit her from doing her job.  I will have her come back and see me in a year or sooner if needed.  I will continue all her medications at the current dose for now.      Plan  1.   Will provide your urine clearance letter.  2.  Continue all other medications at the current dose.   Make healthy dietary choices and exercise regularly.   3.  Follow-up with me in a year or sooner if needed      Thank you for allowing me to participate in the care of Tania Azevedo    This note was completed in part using Dragon voice recognition software. Although reviewed after completion, some word and grammatical errors may occur.    Aime Muniz MD  Cardiology    Orders Placed This Encounter   Procedures     Follow-Up with Cardiologist       Encounter Diagnosis   Name Primary?     Coronary artery disease involving native coronary artery of native heart without angina pectoris Yes       CURRENT MEDICATIONS:  Current Outpatient Medications   Medication Sig Dispense Refill     albuterol (PROVENTIL HFA: VENTOLIN HFA) 108 (90 BASE) MCG/ACT inhaler Inhale 2 puffs into the lungs every 6 hours. 1 Inhaler 2     aspirin 81 MG chewable tablet Take 1 tablet (81 mg) by  mouth daily 30 tablet 0     atorvastatin (LIPITOR) 40 MG tablet Take 1 tablet (40 mg) by mouth daily Follow-up with Heart Clinic for more refills. 90 tablet 3     cetirizine (ZYRTEC) 10 MG tablet Take 10 mg by mouth daily.       FLONASE INHA 50 MCG/DOSE NA INHALE 2 SPRAYS IN EACH NOSTRIL ONCE DAILY (Patient taking differently: INHALE 2 SPRAYS IN EACH NOSTRIL ONCE DAILY PRN) 3 MONTHS 1 YEAR     FLUCONAZOLE PO Take 100 mg by mouth 2 tablets / as directed       hydrochlorothiazide (HYDRODIURIL) 25 MG tablet Take 0.5 tablets (12.5 mg) by mouth daily 45 tablet 2     irbesartan (AVAPRO) 150 MG tablet Take 1 tablet (150 mg) by mouth daily 90 tablet 3     metoprolol tartrate (LOPRESSOR) 25 MG tablet Take 1 tablet (25 mg) by mouth 2 times daily 180 tablet 0     nitroGLYcerin (NITROSTAT) 0.4 MG sublingual tablet Place 1 tablet (0.4 mg) under the tongue every 5 minutes as needed for chest pain 25 tablet 3     pantoprazole (PROTONIX) 40 MG EC tablet Take 1 tablet (40 mg) by mouth daily 90 tablet 3     sertraline (ZOLOFT) 50 MG tablet Take 0.5 tablets (25 mg) by mouth daily 30 tablet 1       ALLERGIES     Allergies   Allergen Reactions     Codeine        PAST MEDICAL HISTORY:  Past Medical History:   Diagnosis Date     CAD (coronary artery disease)     stent 2/9/15     Depression      Dyslipidemia      Hypertension      NSTEMI (non-ST elevated myocardial infarction) (H) 02-06-15     Tobacco abuse        PAST SURGICAL HISTORY:  Past Surgical History:   Procedure Laterality Date     C ANESTH,RADICAL HYSTERECTOMY       COLECTOMY RIGHT Right 6/3/2016    Procedure: COLECTOMY RIGHT;  Surgeon: Dillan Vaughn MD;  Location: [facility] OR     HEART CATH LEFT HEART CATH  02-09-15    SHE to RCA, Aspiration thrombectomy of RCA, 20-30% proximal LAD narrowing, 30% mid LAD, 40% mid first diagonal, prox RCA was occluded with 30% in distal RCA      HEART CATH, ANGIOPLASTY       LAPAROSCOPIC APPENDECTOMY N/A 6/3/2016    Procedure: LAPAROSCOPIC  APPENDECTOMY;  Surgeon: Dillan Vaughn MD;  Location: [facility] OR       FAMILY HISTORY:  Family History   Problem Relation Age of Onset     Unknown/Adopted Mother      Unknown/Adopted Father        SOCIAL HISTORY:  Social History     Socioeconomic History     Marital status: Single     Spouse name: Not on file     Number of children: Not on file     Years of education: Not on file     Highest education level: Not on file   Occupational History     Not on file   Social Needs     Financial resource strain: Not on file     Food insecurity     Worry: Not on file     Inability: Not on file     Transportation needs     Medical: Not on file     Non-medical: Not on file   Tobacco Use     Smoking status: Former Smoker     Packs/day: 1.00     Types: Cigarettes     Start date:      Quit date:      Years since quittin.1     Smokeless tobacco: Never Used     Tobacco comment: 2/6/15 - quit e cig   Substance and Sexual Activity     Alcohol use: No     Alcohol/week: 0.0 standard drinks     Drug use: No     Sexual activity: Yes     Partners: Male     Birth control/protection: Surgical   Lifestyle     Physical activity     Days per week: Not on file     Minutes per session: Not on file     Stress: Not on file   Relationships     Social connections     Talks on phone: Not on file     Gets together: Not on file     Attends Islam service: Not on file     Active member of club or organization: Not on file     Attends meetings of clubs or organizations: Not on file     Relationship status: Not on file     Intimate partner violence     Fear of current or ex partner: Not on file     Emotionally abused: Not on file     Physically abused: Not on file     Forced sexual activity: Not on file   Other Topics Concern     Parent/sibling w/ CABG, MI or angioplasty before 65F 55M? Not Asked      Service Not Asked     Blood Transfusions Not Asked     Caffeine Concern Yes     Comment: 1 can of diet soda and once in a while a  coffee      Occupational Exposure Not Asked     Hobby Hazards Not Asked     Sleep Concern Yes     Comment: not lately     Stress Concern Yes     Comment: Significant other - Terminal Cancer     Weight Concern Not Asked     Special Diet No     Back Care Not Asked     Exercise Yes     Comment: some 2 days per week      Bike Helmet Not Asked     Seat Belt Yes     Self-Exams Not Asked   Social History Narrative     Not on file       General Appearance: No distress, normal body habitus, upright.  ENT/Mouth:  Normal head shape, no evidence of injury or laceration.  EYES: No scleral icterus, normal conjunctivae  Neck:  No evidence of thyromegaly  Chest/Lungs: No audible wheezing. Non labored breathing. No cough.  Cardiovascular: No evidence of elevated jugular venous pressure.   Skin: No xanthelasma, normal skin collar. No evidence of facial lacerations.  Neurologic: No focal neurological defect. Normal speech.  Psychiatric: Alert and oriented x3

## 2021-03-02 NOTE — LETTER
"3/2/2021    DO [facility] [address]  [facility] [address]    RE: Tania PAOLO Roberto Carlos       Dear Colleague,    I had the pleasure of seeing Tania Azevedo in the [facility].    Tania is a 60 year old who is being evaluated via a billable video visit.      How would you like to obtain your AVS? MyChart  If the video visit is dropped, the invitation should be resent by: Send to e-mail at: [email or web address]  Will anyone else be joining your video visit? No      Vitals - Patient Reported 3/2/2021   Height (Patient Reported) 5' 1\"   Weight (Patient Reported) 220 lb   BMI (Based on Pt Reported Ht/Wt) 41.57 kg/m2     Review Of Systems  Skin: NEGATIVE  Eyes:Ears/Nose/Throat: NEGATIVE  Respiratory: NEGATIVE  Cardiovascular:NEGATIVE  Gastrointestinal: NEGATIVE  Genitourinary:NEGATIVE   Musculoskeletal: NEGATIVE  Neurologic: NEGATIVE  Psychiatric: NEGATIVE  Hematologic/Lymphatic/Immunologic: NEGATIVE  Endocrine:  NEGATIVE    Kisha NUGENT    Video Start Time: 4:50 PM  Video-Visit Details    Type of service:  Video Visit    Video End Time:4:50 PM    Originating Location (pt. Location): Home    Distant Location (provider location):  [facility]     Platform used for Video Visit: [employer]     This clinic visit was performed via telephone/video due to COVID-19 restrictions.    Today, I had the pleasure of connecting with Tania Azevedo for a follow-up visit.  She is a pleasant 60 year old patient with a past medical history of inferior myocardial infarction status post PCI in 2015, also noted to have moderate disease in LAD and D1, former smoker, ischemic cardiomyopathy with ejection fraction of 45-50%, hypertension, hyperlipidemia and metabolic syndrome who presents to the clinic for a follow-up appointment.       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  She has DOT appointment coming up and asked me for a " letter. She lives a sedentary life and does not walk or exercise regularly.  However, her work involves lifting and carrying equipment like wheelchair and passengers in and out of the bus.   She reports feeling jittery in the morning but tells me that because she drives in the morning she avoids taking her medications due to missed work. Blood work in the past has shown LDL at target levels. She takes 40 mg of Lipitor.   Her other medications include low-dose aspirin, beta-blocker and angiotensin receptor blocker.    Assessment and plan:   1.  Inferior myocardial infarction status post PCI in 2015  2.  Mild-moderate disease in LAD and D1, and distal RCA, 30 to 40%.  3.  Hypertension  4.  Hyperlipidemia  5.  Obesity     The patient does not exercise or do any sort of physical exertion.  She also has mild to moderate disease in the distal RCA and LAD territory during previous coronary angiogram in 2015.   Stress test performed at the time of last visit was negative for ischemia.  I do not see any cardiac reasons that would prohibit her from doing her job.  I will have her come back and see me in a year or sooner if needed.  I will continue all her medications at the current dose for now.      Plan  1.   Will provide your urine clearance letter.  2.  Continue all other medications at the current dose.   Make healthy dietary choices and exercise regularly.   3.  Follow-up with me in a year or sooner if needed      Thank you for allowing me to participate in the care of Tania Azevedo    This note was completed in part using Dragon voice recognition software. Although reviewed after completion, some word and grammatical errors may occur.    Aime Muniz MD  Cardiology    Orders Placed This Encounter   Procedures     Follow-Up with Cardiologist       Encounter Diagnosis   Name Primary?     Coronary artery disease involving native coronary artery of native heart without angina pectoris Yes       CURRENT  MEDICATIONS:  Current Outpatient Medications   Medication Sig Dispense Refill     albuterol (PROVENTIL HFA: VENTOLIN HFA) 108 (90 BASE) MCG/ACT inhaler Inhale 2 puffs into the lungs every 6 hours. 1 Inhaler 2     aspirin 81 MG chewable tablet Take 1 tablet (81 mg) by mouth daily 30 tablet 0     atorvastatin (LIPITOR) 40 MG tablet Take 1 tablet (40 mg) by mouth daily Follow-up with Heart Clinic for more refills. 90 tablet 3     cetirizine (ZYRTEC) 10 MG tablet Take 10 mg by mouth daily.       FLONASE INHA 50 MCG/DOSE NA INHALE 2 SPRAYS IN EACH NOSTRIL ONCE DAILY (Patient taking differently: INHALE 2 SPRAYS IN EACH NOSTRIL ONCE DAILY PRN) 3 MONTHS 1 YEAR     FLUCONAZOLE PO Take 100 mg by mouth 2 tablets / as directed       hydrochlorothiazide (HYDRODIURIL) 25 MG tablet Take 0.5 tablets (12.5 mg) by mouth daily 45 tablet 2     irbesartan (AVAPRO) 150 MG tablet Take 1 tablet (150 mg) by mouth daily 90 tablet 3     metoprolol tartrate (LOPRESSOR) 25 MG tablet Take 1 tablet (25 mg) by mouth 2 times daily 180 tablet 0     nitroGLYcerin (NITROSTAT) 0.4 MG sublingual tablet Place 1 tablet (0.4 mg) under the tongue every 5 minutes as needed for chest pain 25 tablet 3     pantoprazole (PROTONIX) 40 MG EC tablet Take 1 tablet (40 mg) by mouth daily 90 tablet 3     sertraline (ZOLOFT) 50 MG tablet Take 0.5 tablets (25 mg) by mouth daily 30 tablet 1       ALLERGIES     Allergies   Allergen Reactions     Codeine        PAST MEDICAL HISTORY:  Past Medical History:   Diagnosis Date     CAD (coronary artery disease)     stent 2/9/15     Depression      Dyslipidemia      Hypertension      NSTEMI (non-ST elevated myocardial infarction) (H) 02-06-15     Tobacco abuse        PAST SURGICAL HISTORY:  Past Surgical History:   Procedure Laterality Date     C ANESTH,RADICAL HYSTERECTOMY       COLECTOMY RIGHT Right 6/3/2016    Procedure: COLECTOMY RIGHT;  Surgeon: Dillan Vaughn MD;  Location: [facility] OR     HEART CATH LEFT HEART CATH   02-09-15    SHE to RCA, Aspiration thrombectomy of RCA, 20-30% proximal LAD narrowing, 30% mid LAD, 40% mid first diagonal, prox RCA was occluded with 30% in distal RCA      HEART CATH, ANGIOPLASTY       LAPAROSCOPIC APPENDECTOMY N/A 6/3/2016    Procedure: LAPAROSCOPIC APPENDECTOMY;  Surgeon: Dillan Vaughn MD;  Location: [facility] OR       FAMILY HISTORY:  Family History   Problem Relation Age of Onset     Unknown/Adopted Mother      Unknown/Adopted Father        SOCIAL HISTORY:  Social History     Socioeconomic History     Marital status: Single     Spouse name: Not on file     Number of children: Not on file     Years of education: Not on file     Highest education level: Not on file   Occupational History     Not on file   Social Needs     Financial resource strain: Not on file     Food insecurity     Worry: Not on file     Inability: Not on file     Transportation needs     Medical: Not on file     Non-medical: Not on file   Tobacco Use     Smoking status: Former Smoker     Packs/day: 1.00     Types: Cigarettes     Start date:      Quit date:      Years since quittin.1     Smokeless tobacco: Never Used     Tobacco comment: 2/6/15 - quit e cig   Substance and Sexual Activity     Alcohol use: No     Alcohol/week: 0.0 standard drinks     Drug use: No     Sexual activity: Yes     Partners: Male     Birth control/protection: Surgical   Lifestyle     Physical activity     Days per week: Not on file     Minutes per session: Not on file     Stress: Not on file   Relationships     Social connections     Talks on phone: Not on file     Gets together: Not on file     Attends Yarsani service: Not on file     Active member of club or organization: Not on file     Attends meetings of clubs or organizations: Not on file     Relationship status: Not on file     Intimate partner violence     Fear of current or ex partner: Not on file     Emotionally abused: Not on file     Physically abused: Not on file      Forced sexual activity: Not on file   Other Topics Concern     Parent/sibling w/ CABG, MI or angioplasty before 65F 55M? Not Asked      Service Not Asked     Blood Transfusions Not Asked     Caffeine Concern Yes     Comment: 1 can of diet soda and once in a while a coffee      Occupational Exposure Not Asked     Hobby Hazards Not Asked     Sleep Concern Yes     Comment: not lately     Stress Concern Yes     Comment: Significant other - Terminal Cancer     Weight Concern Not Asked     Special Diet No     Back Care Not Asked     Exercise Yes     Comment: some 2 days per week      Bike Helmet Not Asked     Seat Belt Yes     Self-Exams Not Asked   Social History Narrative     Not on file       General Appearance: No distress, normal body habitus, upright.  ENT/Mouth:  Normal head shape, no evidence of injury or laceration.  EYES: No scleral icterus, normal conjunctivae  Neck:  No evidence of thyromegaly  Chest/Lungs: No audible wheezing. Non labored breathing. No cough.  Cardiovascular: No evidence of elevated jugular venous pressure.   Skin: No xanthelasma, normal skin collar. No evidence of facial lacerations.  Neurologic: No focal neurological defect. Normal speech.  Psychiatric: Alert and oriented x3        Thank you for allowing me to participate in the care of your patient.      Sincerely,     Aime Muniz MD     [facility]    cc:   Judah Alexandre,   [facility]  [address] [place] [address]

## 2021-03-08 DIAGNOSIS — I21.4 NSTEMI (NON-ST ELEVATED MYOCARDIAL INFARCTION) (H): ICD-10-CM

## 2021-03-08 DIAGNOSIS — K21.9 GASTROESOPHAGEAL REFLUX DISEASE WITHOUT ESOPHAGITIS: ICD-10-CM

## 2021-03-08 DIAGNOSIS — I10 BENIGN ESSENTIAL HYPERTENSION: ICD-10-CM

## 2021-03-08 RX ORDER — NITROGLYCERIN 0.4 MG/1
0.4 TABLET SUBLINGUAL EVERY 5 MIN PRN
Qty: 25 TABLET | Refills: 0 | Status: SHIPPED | OUTPATIENT
Start: 2021-03-08 | End: 2021-04-06

## 2021-03-08 RX ORDER — ATORVASTATIN CALCIUM 40 MG/1
40 TABLET, FILM COATED ORAL DAILY
Qty: 90 TABLET | Refills: 3 | Status: SHIPPED | OUTPATIENT
Start: 2021-03-08 | End: 2022-03-04

## 2021-03-08 RX ORDER — HYDROCHLOROTHIAZIDE 12.5 MG/1
12.5 CAPSULE ORAL DAILY
Qty: 90 CAPSULE | Refills: 3 | Status: SHIPPED | OUTPATIENT
Start: 2021-03-08 | End: 2022-03-04

## 2021-03-08 RX ORDER — PANTOPRAZOLE SODIUM 40 MG/1
40 TABLET, DELAYED RELEASE ORAL DAILY
Qty: 90 TABLET | Refills: 3 | Status: SHIPPED | OUTPATIENT
Start: 2021-03-08 | End: 2022-03-04

## 2021-03-08 RX ORDER — IRBESARTAN 150 MG/1
150 TABLET ORAL DAILY
Qty: 90 TABLET | Refills: 3 | Status: SHIPPED | OUTPATIENT
Start: 2021-03-08 | End: 2022-02-03

## 2021-03-08 NOTE — TELEPHONE ENCOUNTER
Patient called in asking for refills and for a refill on Zoloft. Checking Care Every Where it appears her PCP Dr. TONIE Alexandre filled this a year ago. I told her that she should reach out to him for the refill and she said she would. I did refill the following:    NTG  Avapro 150 MG  Atorvastatin 40 MG  Protonix 40 MG  hydrochlorothiazide 12.5 MG

## 2021-04-06 DIAGNOSIS — I21.4 NSTEMI (NON-ST ELEVATED MYOCARDIAL INFARCTION) (H): ICD-10-CM

## 2021-04-06 RX ORDER — NITROGLYCERIN 0.4 MG/1
0.4 TABLET SUBLINGUAL EVERY 5 MIN PRN
Qty: 25 TABLET | Refills: 1 | Status: SHIPPED | OUTPATIENT
Start: 2021-04-06 | End: 2024-04-24

## 2021-05-10 DIAGNOSIS — I21.4 NSTEMI (NON-ST ELEVATED MYOCARDIAL INFARCTION) (H): ICD-10-CM

## 2021-05-10 RX ORDER — METOPROLOL TARTRATE 25 MG/1
25 TABLET, FILM COATED ORAL 2 TIMES DAILY
Qty: 180 TABLET | Refills: 3 | Status: SHIPPED | OUTPATIENT
Start: 2021-05-10 | End: 2022-03-04

## 2021-09-19 ENCOUNTER — HEALTH MAINTENANCE LETTER (OUTPATIENT)
Age: 61
End: 2021-09-19

## 2022-01-09 ENCOUNTER — HEALTH MAINTENANCE LETTER (OUTPATIENT)
Age: 62
End: 2022-01-09

## 2022-02-03 DIAGNOSIS — I10 BENIGN ESSENTIAL HYPERTENSION: ICD-10-CM

## 2022-02-03 RX ORDER — IRBESARTAN 150 MG/1
150 TABLET ORAL DAILY
Qty: 90 TABLET | Refills: 0 | Status: SHIPPED | OUTPATIENT
Start: 2022-02-03 | End: 2022-03-04

## 2022-03-04 ENCOUNTER — VIRTUAL VISIT (OUTPATIENT)
Dept: CARDIOLOGY | Facility: CLINIC | Age: 62
End: 2022-03-04
Payer: COMMERCIAL

## 2022-03-04 DIAGNOSIS — I25.10 CORONARY ARTERY DISEASE INVOLVING NATIVE CORONARY ARTERY OF NATIVE HEART WITHOUT ANGINA PECTORIS: Primary | ICD-10-CM

## 2022-03-04 DIAGNOSIS — I21.4 NSTEMI (NON-ST ELEVATED MYOCARDIAL INFARCTION) (H): ICD-10-CM

## 2022-03-04 DIAGNOSIS — I10 BENIGN ESSENTIAL HYPERTENSION: ICD-10-CM

## 2022-03-04 PROCEDURE — 99213 OFFICE O/P EST LOW 20 MIN: CPT | Mod: 95 | Performed by: INTERNAL MEDICINE

## 2022-03-04 RX ORDER — PANTOPRAZOLE SODIUM 20 MG/1
40 TABLET, DELAYED RELEASE ORAL DAILY
COMMUNITY

## 2022-03-04 RX ORDER — FENOFIBRATE 160 MG/1
160 TABLET ORAL DAILY
Qty: 90 TABLET | Refills: 3 | Status: SHIPPED | OUTPATIENT
Start: 2022-03-04 | End: 2022-11-11

## 2022-03-04 RX ORDER — IRBESARTAN 150 MG/1
150 TABLET ORAL DAILY
Qty: 90 TABLET | Refills: 0 | Status: SHIPPED | OUTPATIENT
Start: 2022-03-04 | End: 2022-11-01

## 2022-03-04 RX ORDER — HYDROCHLOROTHIAZIDE 12.5 MG/1
12.5 CAPSULE ORAL DAILY
Qty: 90 CAPSULE | Refills: 3 | Status: SHIPPED | OUTPATIENT
Start: 2022-03-04 | End: 2023-02-03

## 2022-03-04 RX ORDER — ATORVASTATIN CALCIUM 40 MG/1
40 TABLET, FILM COATED ORAL DAILY
Qty: 90 TABLET | Refills: 3 | Status: SHIPPED | OUTPATIENT
Start: 2022-03-04 | End: 2023-05-04

## 2022-03-04 RX ORDER — METOPROLOL TARTRATE 25 MG/1
25 TABLET, FILM COATED ORAL 2 TIMES DAILY
Qty: 180 TABLET | Refills: 3 | Status: SHIPPED | OUTPATIENT
Start: 2022-03-04 | End: 2022-11-11

## 2022-03-04 NOTE — PROGRESS NOTES
Tania is a 61 year old who is being evaluated via a billable video visit.      How would you like to obtain your AVS? MyChart  If the video visit is dropped, the invitation should be resent by: Send to e-mail at: [email or web address]  Will anyone else be joining your video visit? No     Review Of Systems  Skin: NEGATIVE  Eyes:Ears/Nose/Throat: NEGATIVE  Respiratory: no issues  Cardiovascular:no issues  Gastrointestinal: reflux  Genitourinary:NEGATIVE   Musculoskeletal: NEGATIVE  Neurologic: NEGATIVE  Psychiatric: NEGATIVE  Hematologic/Lymphatic/Immunologic: NEGATIVE  Endocrine:  NEGATIVE  Vitals - Patient Reported  Systolic (Patient Reported): 101 (reading from yesterday 3/3/22)  Diastolic (Patient Reported): 62 (reading from yesterday 3/3/22)  Weight (Patient Reported): 95.3 kg (210 lb)  Pulse (Patient Reported):  (n/a)    Telephone number of patient:  [phone]    Natividad Brennan LPN    Video Start Time: 3:420 PM     Video-Visit Details    Type of service:  Video Visit    Video End Time:3:41 PM    Originating Location (pt. Location): Home    Distant Location (provider location):  [facility]     Platform used for Video Visit: [employer]    This clinic visit was performed via telephone/video due to COVID-19 restrictions.  Today, I had the pleasure of connecting with Tania Azevedo for a follow-up visit.  She is a pleasant 61 year old patient with a past medical history of inferior myocardial infarction status post PCI in 2015, also noted to have moderate disease in LAD and D1, former smoker, ischemic cardiomyopathy with ejection fraction of 45-50%, hypertension, hyperlipidemia and metabolic syndrome who presents to the clinic for a follow-up appointment.       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  She requests a letter for DOT clearance. She lives a sedentary life and does not walk or exercise regularly.  However, her work involves lifting and carrying equipment like  wheelchair and passengers in and out of the bus.  Blood work in the past has shown LDL at target levels but TG have always been high. She takes 40 mg of Lipitor.   Her other medications include low-dose aspirin, beta-blocker and angiotensin receptor blocker.    Assessment and plan:   1.  Inferior myocardial infarction status post PCI in 2015  2.  Mild-moderate disease in LAD and D1, and distal RCA, 30 to 40%.  3.  Hypertension  4.  Hyperlipidemia  5.  Obesity  6.  DOT clearance     The patient does not exercise or do any sort of physical exertion.  She also has mild to moderate disease in the distal RCA and LAD territory during previous coronary angiogram in 2015.   Stress test performed in 2020 was negative for ischemia.  I do not see any cardiac reasons that would prohibit her from doing her job. For this reason we will provide her with a clearance letter.  I will have her come back and see me in a year or sooner if needed.  I will continue all her medications at the current dose for now.      Plan  1.   Will provide you with a DOT clearance letter.  2.  Continue all other medications at the current dose. Make healthy dietary choices and exercise regularly. Start fenofibrate.   3.  Follow-up with me in a year or sooner if needed    Thank you for allowing me to participate in the care of Tania Azevedo    This note was completed in part using Dragon voice recognition software. Although reviewed after completion, some word and grammatical errors may occur.    Aime Muniz MD  Cardiology    No orders of the defined types were placed in this encounter.      Orders Placed This Encounter   Medications     pantoprazole (PROTONIX) 20 MG EC tablet     Sig: Take 40 mg by mouth daily       There are no discontinued medications.    Encounter Diagnosis   Name Primary?     Coronary artery disease involving native coronary artery of native heart without angina pectoris Yes       CURRENT MEDICATIONS:  Current Outpatient  Medications   Medication Sig Dispense Refill     albuterol (PROVENTIL HFA: VENTOLIN HFA) 108 (90 BASE) MCG/ACT inhaler Inhale 2 puffs into the lungs every 6 hours. 1 Inhaler 2     aspirin 81 MG chewable tablet Take 1 tablet (81 mg) by mouth daily 30 tablet 0     atorvastatin (LIPITOR) 40 MG tablet Take 1 tablet (40 mg) by mouth daily Follow-up with Heart Clinic for more refills. 90 tablet 3     cetirizine (ZYRTEC) 10 MG tablet Take 10 mg by mouth daily.       FLONASE INHA 50 MCG/DOSE NA INHALE 2 SPRAYS IN EACH NOSTRIL ONCE DAILY (Patient taking differently: INHALE 2 SPRAYS IN EACH NOSTRIL ONCE DAILY PRN) 3 MONTHS 1 YEAR     hydrochlorothiazide (MICROZIDE) 12.5 MG capsule Take 1 capsule (12.5 mg) by mouth daily 90 capsule 3     irbesartan (AVAPRO) 150 MG tablet Take 1 tablet (150 mg) by mouth daily 90 tablet 0     metoprolol tartrate (LOPRESSOR) 25 MG tablet Take 1 tablet (25 mg) by mouth 2 times daily 180 tablet 3     nitroGLYcerin (NITROSTAT) 0.4 MG sublingual tablet Place 1 tablet (0.4 mg) under the tongue every 5 minutes as needed for chest pain 25 tablet 1     pantoprazole (PROTONIX) 20 MG EC tablet Take 40 mg by mouth daily       sertraline (ZOLOFT) 50 MG tablet Take 0.5 tablets (25 mg) by mouth daily 30 tablet 1     FLUCONAZOLE PO Take 100 mg by mouth 2 tablets / as directed (Patient not taking: Reported on 3/4/2022)       pantoprazole (PROTONIX) 40 MG EC tablet Take 1 tablet (40 mg) by mouth daily 90 tablet 3       ALLERGIES     Allergies   Allergen Reactions     Codeine        PAST MEDICAL HISTORY:  Past Medical History:   Diagnosis Date     CAD (coronary artery disease)     stent 2/9/15     Depression      Dyslipidemia      Hypertension      NSTEMI (non-ST elevated myocardial infarction) (H) 02-06-15     Tobacco abuse        PAST SURGICAL HISTORY:  Past Surgical History:   Procedure Laterality Date     COLECTOMY RIGHT Right 6/3/2016    Procedure: COLECTOMY RIGHT;  Surgeon: Dillan Vaughn MD;   Location: [facility] OR     HEART CATH LEFT HEART CATH  02-09-15    SHE to RCA, Aspiration thrombectomy of RCA, 20-30% proximal LAD narrowing, 30% mid LAD, 40% mid first diagonal, prox RCA was occluded with 30% in distal RCA      HEART CATH, ANGIOPLASTY       LAPAROSCOPIC APPENDECTOMY N/A 6/3/2016    Procedure: LAPAROSCOPIC APPENDECTOMY;  Surgeon: Dillan Vaughn MD;  Location: [facility] OR     ZZC ANESTH,RADICAL HYSTERECTOMY         FAMILY HISTORY:  Family History   Problem Relation Age of Onset     Unknown/Adopted Mother      Unknown/Adopted Father        SOCIAL HISTORY:  Social History     Socioeconomic History     Marital status: Single     Spouse name: None     Number of children: None     Years of education: None     Highest education level: None   Occupational History     None   Tobacco Use     Smoking status: Former Smoker     Packs/day: 1.00     Types: Cigarettes     Start date:      Quit date:      Years since quittin.1     Smokeless tobacco: Never Used     Tobacco comment: 2/6/15 - quit e cig   Substance and Sexual Activity     Alcohol use: No     Alcohol/week: 0.0 standard drinks     Drug use: No     Sexual activity: Yes     Partners: Male     Birth control/protection: Surgical   Other Topics Concern     Parent/sibling w/ CABG, MI or angioplasty before 65F 55M? Not Asked      Service Not Asked     Blood Transfusions Not Asked     Caffeine Concern Yes     Comment: 1 can of diet soda and once in a while a coffee      Occupational Exposure Not Asked     Hobby Hazards Not Asked     Sleep Concern Yes     Comment: not lately     Stress Concern Yes     Comment: Significant other - Terminal Cancer     Weight Concern Not Asked     Special Diet No     Back Care Not Asked     Exercise Yes     Comment: some 2 days per week      Bike Helmet Not Asked     Seat Belt Yes     Self-Exams Not Asked   Social History Narrative     None     Social Determinants of Health     Financial Resource Strain: Not on file    Food Insecurity: Not on file   Transportation Needs: Not on file   Physical Activity: Not on file   Stress: Not on file   Social Connections: Not on file   Intimate Partner Violence: Not on file   Housing Stability: Not on file       General Appearance: No distress, normal body habitus, upright.  ENT/Mouth:  Normal head shape, no evidence of injury or laceration.  EYES: No scleral icterus, normal conjunctivae  Neck:  No evidence of thyromegaly  Chest/Lungs: No audible wheezing. Non labored breathing. No cough.  Cardiovascular: No evidence of elevated jugular venous pressure.   Skin: No xanthelasma, normal skin collar. No evidence of facial lacerations.  Neurologic: No focal neurological defect. Normal speech.  Psychiatric: Alert and oriented x3

## 2022-03-04 NOTE — LETTER
3/4/2022    Judah Alexandre DO  [facility] [address]  [facility] [address]    RE: Tania Azevedo       Dear Colleague,     I had the pleasure of seeing Tania Azevedo in the [facility].  Tania is a 61 year old who is being evaluated via a billable video visit.      How would you like to obtain your AVS? MyChart  If the video visit is dropped, the invitation should be resent by: Send to e-mail at: [email or web address] Correspondent  Will anyone else be joining your video visit? No     Review Of Systems  Skin: NEGATIVE  Eyes:Ears/Nose/Throat: NEGATIVE  Respiratory: no issues  Cardiovascular:no issues  Gastrointestinal: reflux  Genitourinary:NEGATIVE   Musculoskeletal: NEGATIVE  Neurologic: NEGATIVE  Psychiatric: NEGATIVE  Hematologic/Lymphatic/Immunologic: NEGATIVE  Endocrine:  NEGATIVE  Vitals - Patient Reported  Systolic (Patient Reported): 101 (reading from yesterday 3/3/22)  Diastolic (Patient Reported): 62 (reading from yesterday 3/3/22)  Weight (Patient Reported): 95.3 kg (210 lb)  Pulse (Patient Reported):  (n/a)    Telephone number of patient:  [phone]    Natividad Brennan LPN    Video Start Time: 3:420 PM     Video-Visit Details    Type of service:  Video Visit    Video End Time:3:41 PM    Originating Location (pt. Location): Home    Distant Location (provider location):  [facility]     Platform used for Video Visit: [employer]    This clinic visit was performed via telephone/video due to COVID-19 restrictions.  Today, I had the pleasure of connecting with Tania Azevedo for a follow-up visit.  She is a pleasant 61 year old patient with a past medical history of inferior myocardial infarction status post PCI in 2015, also noted to have moderate disease in LAD and D1, former smoker, ischemic cardiomyopathy with ejection fraction of 45-50%, hypertension, hyperlipidemia and metabolic syndrome who presents to the clinic for a follow-up appointment.       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  She requests a letter for DOT clearance. She lives a sedentary life and does not walk or exercise regularly.  However, her work involves lifting and carrying equipment like wheelchair and passengers in and out of the bus.  Blood work in the past has shown LDL at target levels but TG have always been high. She takes 40 mg of Lipitor.   Her other medications include low-dose aspirin, beta-blocker and angiotensin receptor blocker.    Assessment and plan:   1.  Inferior myocardial infarction status post PCI in 2015  2.  Mild-moderate disease in LAD and D1, and distal RCA, 30 to 40%.  3.  Hypertension  4.  Hyperlipidemia  5.  Obesity  6.  DOT clearance     The patient does not exercise or do any sort of physical exertion.  She also has mild to moderate disease in the distal RCA and LAD territory during previous coronary angiogram in 2015.   Stress test performed in 2020 was negative for ischemia.  I do not see any cardiac reasons that would prohibit her from doing her job. For this reason we will provide her with a clearance letter.  I will have her come back and see me in a year or sooner if needed.  I will continue all her medications at the current dose for now.      Plan  1.   Will provide you with a DOT clearance letter.  2.  Continue all other medications at the current dose. Make healthy dietary choices and exercise regularly. Start fenofibrate.   3.  Follow-up with me in a year or sooner if needed    Thank you for allowing me to participate in the care of Tania Azevedo    This note was completed in part using Dragon voice recognition software. Although reviewed after completion, some word and grammatical errors may occur.    Aime Muniz MD  Cardiology    No orders of the defined types were placed in this encounter.      Orders Placed This Encounter   Medications     pantoprazole (PROTONIX) 20 MG EC tablet     Sig: Take 40 mg by mouth daily       There are no discontinued  medications.    Encounter Diagnosis   Name Primary?     Coronary artery disease involving native coronary artery of native heart without angina pectoris Yes       CURRENT MEDICATIONS:  Current Outpatient Medications   Medication Sig Dispense Refill     albuterol (PROVENTIL HFA: VENTOLIN HFA) 108 (90 BASE) MCG/ACT inhaler Inhale 2 puffs into the lungs every 6 hours. 1 Inhaler 2     aspirin 81 MG chewable tablet Take 1 tablet (81 mg) by mouth daily 30 tablet 0     atorvastatin (LIPITOR) 40 MG tablet Take 1 tablet (40 mg) by mouth daily Follow-up with Heart Clinic for more refills. 90 tablet 3     cetirizine (ZYRTEC) 10 MG tablet Take 10 mg by mouth daily.       FLONASE INHA 50 MCG/DOSE NA INHALE 2 SPRAYS IN EACH NOSTRIL ONCE DAILY (Patient taking differently: INHALE 2 SPRAYS IN EACH NOSTRIL ONCE DAILY PRN) 3 MONTHS 1 YEAR     hydrochlorothiazide (MICROZIDE) 12.5 MG capsule Take 1 capsule (12.5 mg) by mouth daily 90 capsule 3     irbesartan (AVAPRO) 150 MG tablet Take 1 tablet (150 mg) by mouth daily 90 tablet 0     metoprolol tartrate (LOPRESSOR) 25 MG tablet Take 1 tablet (25 mg) by mouth 2 times daily 180 tablet 3     nitroGLYcerin (NITROSTAT) 0.4 MG sublingual tablet Place 1 tablet (0.4 mg) under the tongue every 5 minutes as needed for chest pain 25 tablet 1     pantoprazole (PROTONIX) 20 MG EC tablet Take 40 mg by mouth daily       sertraline (ZOLOFT) 50 MG tablet Take 0.5 tablets (25 mg) by mouth daily 30 tablet 1     FLUCONAZOLE PO Take 100 mg by mouth 2 tablets / as directed (Patient not taking: Reported on 3/4/2022)       pantoprazole (PROTONIX) 40 MG EC tablet Take 1 tablet (40 mg) by mouth daily 90 tablet 3       ALLERGIES     Allergies   Allergen Reactions     Codeine        PAST MEDICAL HISTORY:  Past Medical History:   Diagnosis Date     CAD (coronary artery disease)     stent 2/9/15     Depression      Dyslipidemia      Hypertension      NSTEMI (non-ST elevated myocardial infarction) (H) 02-06-15      Tobacco abuse        PAST SURGICAL HISTORY:  Past Surgical History:   Procedure Laterality Date     COLECTOMY RIGHT Right 6/3/2016    Procedure: COLECTOMY RIGHT;  Surgeon: Dillan Vaughn MD;  Location: [facility] OR     HEART CATH LEFT HEART CATH  02-09-15    SHE to RCA, Aspiration thrombectomy of RCA, 20-30% proximal LAD narrowing, 30% mid LAD, 40% mid first diagonal, prox RCA was occluded with 30% in distal RCA      HEART CATH, ANGIOPLASTY       LAPAROSCOPIC APPENDECTOMY N/A 6/3/2016    Procedure: LAPAROSCOPIC APPENDECTOMY;  Surgeon: Dillan Vaughn MD;  Location:  OR     ZZC ANESTH,RADICAL HYSTERECTOMY         FAMILY HISTORY:  Family History   Problem Relation Age of Onset     Unknown/Adopted Mother      Unknown/Adopted Father        SOCIAL HISTORY:  Social History     Socioeconomic History     Marital status: Single     Spouse name: None     Number of children: None     Years of education: None     Highest education level: None   Occupational History     None   Tobacco Use     Smoking status: Former Smoker     Packs/day: 1.00     Types: Cigarettes     Start date:      Quit date:      Years since quittin.1     Smokeless tobacco: Never Used     Tobacco comment: 2/6/15 - quit e cig   Substance and Sexual Activity     Alcohol use: No     Alcohol/week: 0.0 standard drinks     Drug use: No     Sexual activity: Yes     Partners: Male     Birth control/protection: Surgical   Other Topics Concern     Parent/sibling w/ CABG, MI or angioplasty before 65F 55M? Not Asked      Service Not Asked     Blood Transfusions Not Asked     Caffeine Concern Yes     Comment: 1 can of diet soda and once in a while a coffee      Occupational Exposure Not Asked     Hobby Hazards Not Asked     Sleep Concern Yes     Comment: not lately     Stress Concern Yes     Comment: Significant other - Terminal Cancer     Weight Concern Not Asked     Special Diet No     Back Care Not Asked     Exercise Yes     Comment:  some 2 days per week      Bike Helmet Not Asked     Seat Belt Yes     Self-Exams Not Asked   Social History Narrative     None     Social Determinants of Health     Financial Resource Strain: Not on file   Food Insecurity: Not on file   Transportation Needs: Not on file   Physical Activity: Not on file   Stress: Not on file   Social Connections: Not on file   Intimate Partner Violence: Not on file   Housing Stability: Not on file       General Appearance: No distress, normal body habitus, upright.  ENT/Mouth:  Normal head shape, no evidence of injury or laceration.  EYES: No scleral icterus, normal conjunctivae  Neck:  No evidence of thyromegaly  Chest/Lungs: No audible wheezing. Non labored breathing. No cough.  Cardiovascular: No evidence of elevated jugular venous pressure.   Skin: No xanthelasma, normal skin collar. No evidence of facial lacerations.  Neurologic: No focal neurological defect. Normal speech.  Psychiatric: Alert and oriented x3      Thank you for allowing me to participate in the care of your patient.      Sincerely,     Aime Muniz MD     [facility]  cc:   No referring provider defined for this encounter.

## 2022-03-22 ENCOUNTER — TELEPHONE (OUTPATIENT)
Dept: CARDIOLOGY | Facility: CLINIC | Age: 62
End: 2022-03-22
Payer: COMMERCIAL

## 2022-03-22 NOTE — TELEPHONE ENCOUNTER
[facility] Call Center    Phone Message    May a detailed message be left on voicemail: no     Reason for Call: Form or Letter   Type or form/letter needing completion: DOT   Provider: Aime Muniz MD   Date form needed: ASA 3/22/2022  Once completed: Mail form to Name: Tania Azevedo [id number] [place] [address]      Action Taken: Other: [facility]    Travel Screening: Not Applicable     NOTE: Tania stated Dr. Muniz stated he would send out a letter regarding her DOT, she has not received the letter. Please reach out to her at [phone].

## 2022-03-22 NOTE — TELEPHONE ENCOUNTER
DOT letter done and signed by Dr. Muniz and placed in mail.    Patient notified and told we used the letter from last year and she expressed her appreciation for this.

## 2022-11-01 DIAGNOSIS — I10 BENIGN ESSENTIAL HYPERTENSION: ICD-10-CM

## 2022-11-01 RX ORDER — IRBESARTAN 150 MG/1
150 TABLET ORAL DAILY
Qty: 90 TABLET | Refills: 1 | Status: SHIPPED | OUTPATIENT
Start: 2022-11-01 | End: 2023-05-04

## 2022-11-11 DIAGNOSIS — I10 BENIGN ESSENTIAL HYPERTENSION: ICD-10-CM

## 2022-11-11 DIAGNOSIS — I21.4 NSTEMI (NON-ST ELEVATED MYOCARDIAL INFARCTION) (H): ICD-10-CM

## 2022-11-11 DIAGNOSIS — I25.10 CORONARY ARTERY DISEASE INVOLVING NATIVE CORONARY ARTERY OF NATIVE HEART WITHOUT ANGINA PECTORIS: ICD-10-CM

## 2022-11-11 RX ORDER — METOPROLOL TARTRATE 25 MG/1
25 TABLET, FILM COATED ORAL 2 TIMES DAILY
Qty: 180 TABLET | Refills: 3 | Status: SHIPPED | OUTPATIENT
Start: 2022-11-11 | End: 2024-01-31

## 2022-11-11 RX ORDER — FENOFIBRATE 160 MG/1
160 TABLET ORAL DAILY
Qty: 90 TABLET | Refills: 3 | Status: SHIPPED | OUTPATIENT
Start: 2022-11-11 | End: 2023-10-31

## 2022-11-20 ENCOUNTER — HEALTH MAINTENANCE LETTER (OUTPATIENT)
Age: 62
End: 2022-11-20

## 2022-12-24 ENCOUNTER — HEALTH MAINTENANCE LETTER (OUTPATIENT)
Age: 62
End: 2022-12-24

## 2023-02-03 ENCOUNTER — TELEPHONE (OUTPATIENT)
Dept: CARDIOLOGY | Facility: CLINIC | Age: 63
End: 2023-02-03
Payer: COMMERCIAL

## 2023-02-03 DIAGNOSIS — I10 BENIGN ESSENTIAL HYPERTENSION: ICD-10-CM

## 2023-02-03 RX ORDER — HYDROCHLOROTHIAZIDE 12.5 MG/1
12.5 CAPSULE ORAL DAILY
Qty: 90 CAPSULE | Refills: 0 | Status: SHIPPED | OUTPATIENT
Start: 2023-02-03 | End: 2023-02-04

## 2023-02-03 NOTE — TELEPHONE ENCOUNTER
Patient had sent to us, from her PCP, a copy of the release of information and her lipid profile lab results from November 2022.    ALT result  32        These are sent to scan.    Barbara Betancourt RN on 2/3/2023 at 1:12 PM

## 2023-02-03 NOTE — TELEPHONE ENCOUNTER
Received refill request for:  hydrochlorothiazide     Future Appointments   Date Time Provider Department Center   3/31/2023  8:45 AM Aime Muniz MD [facility] Refill Guideline reviewed.  Medication meets criteria for refill.    Swathi Yan RN, BSN  02/03/23 at 3:43 PM

## 2023-02-04 ENCOUNTER — MYC REFILL (OUTPATIENT)
Dept: CARDIOLOGY | Facility: CLINIC | Age: 63
End: 2023-02-04
Payer: COMMERCIAL

## 2023-02-04 DIAGNOSIS — I10 BENIGN ESSENTIAL HYPERTENSION: ICD-10-CM

## 2023-02-06 RX ORDER — HYDROCHLOROTHIAZIDE 12.5 MG/1
12.5 CAPSULE ORAL DAILY
Qty: 90 CAPSULE | Refills: 0 | Status: SHIPPED | OUTPATIENT
Start: 2023-02-06 | End: 2023-02-06

## 2023-02-06 RX ORDER — HYDROCHLOROTHIAZIDE 12.5 MG/1
12.5 CAPSULE ORAL DAILY
Qty: 53 CAPSULE | Refills: 0 | Status: SHIPPED | OUTPATIENT
Start: 2023-02-06 | End: 2023-05-04

## 2023-03-31 ENCOUNTER — VIRTUAL VISIT (OUTPATIENT)
Dept: CARDIOLOGY | Facility: CLINIC | Age: 63
End: 2023-03-31
Payer: COMMERCIAL

## 2023-03-31 DIAGNOSIS — I10 ESSENTIAL HYPERTENSION: Primary | ICD-10-CM

## 2023-03-31 PROCEDURE — 99214 OFFICE O/P EST MOD 30 MIN: CPT | Mod: VID | Performed by: INTERNAL MEDICINE

## 2023-03-31 RX ORDER — METFORMIN HCL 500 MG
1000 TABLET, EXTENDED RELEASE 24 HR ORAL
COMMUNITY
Start: 2023-02-08

## 2023-03-31 NOTE — LETTER
3/31/2023    Judah Alexandre DO  [facility] [address]  [facility] [address]    RE: Tania PAOLO Roberto Carlos       Dear Colleague,     I had the pleasure of seeing Tania Azevedo in the [facility].  Tania is a 62 year old who is being evaluated via a billable video visit.      How would you like to obtain your AVS? MyChart  If the video visit is dropped, the invitation should be resent by: Text to cell phone: [phone]  Will anyone else be joining your video visit? No    Review Of Systems  Skin: negative  Eyes: negative  Ears/Nose/Throat: negative  Respiratory: No shortness of breath, dyspnea on exertion, cough, or hemoptysis  Cardiovascular: negative  Gastrointestinal: negative  Genitourinary: negative  Musculoskeletal: negative  Neurologic: negative  Psychiatric: negative  Hematologic/Lymphatic/Immunologic: negative  Endocrine: negativeReview Of Systems  Skin: negative  Eyes: negative  Ears/Nose/Throat: negative  Respiratory: No shortness of breath, dyspnea on exertion, cough, or hemoptysis  Cardiovascular: negative  Gastrointestinal: negative  Genitourinary: negative  Musculoskeletal: negative  Neurologic: negative  Psychiatric: negative  Hematologic/Lymphatic/Immunologic: negative  Endocrine: negative    Video-Visit Details  Leonid    Start: 9.30  End: 10.00    Type of service:  Video Visit     [place] Moise    Originating Location (pt. Location): Home    Distant Location (provider location):  On-site  Platform used for Video Visit: [facility]     This clinic visit was performed via telephone/video .    Today, I had the pleasure of connecting with Tania Azevedo for a follow-up visit.  She is a pleasant 62 year old patient with a past medical history of inferior myocardial infarction status post PCI in 2015, also noted to have moderate disease in LAD and D1, former smoker, ischemic cardiomyopathy with ejection fraction of 45-50%, hypertension, hyperlipidemia and metabolic syndrome who presents  for a follow-up appointment.       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  She requests a letter for DOT clearance. She lives a sedentary life and does not walk or exercise regularly.  However, her work involves lifting and carrying equipment like wheelchair and passengers in and out of the bus and she is able to do all this without any issues. She does chest pain, SOB at rest or on exertion.  Blood work in the past has shown LDL at target levels but TG have always been high. She takes 40 mg of Lipitor.   Her other medications include low-dose aspirin, beta-blocker and angiotensin receptor blocker.    Last echocardiogram which I interpreted today shows, Normal bi-v size and function. Last EKG shows sinus rhythm.      Assessment and plan:   1.  Inferior myocardial infarction status post PCI in 2015  2.  Mild-moderate disease in LAD and D1, and distal RCA, 30 to 40%.  3.  Hypertension- controlled  4.  Hyperlipidemia  5.  Obesity  6.  DOT clearance     It was a pleasure to meet Ms Azevedo in clinic today. She does not exercise or do any sort of physical exertion besides work where she has to do quite a bit of lifting and moving things like wheelchairs and passengers in and out of the bus. She is asymptomatic on doing this work.     She ahd PCI of RCA after inferior MI and has mild to moderate disease in the distal RCA and LAD territory whic is being managed medically.   Stress test performed in 2020 was negative for ischemia.  I do not see any cardiac reasons that would prohibit her from doing her job. For this reason, we will provide her with a clearance letter.  I will have her come back and see me in a year or sooner if needed.  I will continue all her medications at the current dose for now.      Plan  1.   Will provide you with a DOT clearance letter.  2.  Continue all other medications at the current dose. Make healthy dietary choices and exercise regularly. Start fenofibrate.   3.  Follow-up with me  in a year or sooner if needed    Thank you for allowing me to participate in the care of Tania Azevedo    This note was completed in part using Dragon voice recognition software. Although reviewed after completion, some word and grammatical errors may occur.    Aime Muinz MD  Cardiology    No orders of the defined types were placed in this encounter.      Orders Placed This Encounter   Medications    metFORMIN (GLUCOPHAGE XR) 500 MG 24 hr tablet     Sig: Take 1,000 mg by mouth       There are no discontinued medications.    Encounter Diagnosis   Name Primary?    Essential hypertension Yes       CURRENT MEDICATIONS:  Current Outpatient Medications   Medication Sig Dispense Refill    albuterol (PROVENTIL HFA: VENTOLIN HFA) 108 (90 BASE) MCG/ACT inhaler Inhale 2 puffs into the lungs every 6 hours. 1 Inhaler 2    aspirin 81 MG chewable tablet Take 1 tablet (81 mg) by mouth daily 30 tablet 0    atorvastatin (LIPITOR) 40 MG tablet Take 1 tablet (40 mg) by mouth daily Follow-up with Heart Clinic for more refills. 90 tablet 3    cetirizine (ZYRTEC) 10 MG tablet Take 10 mg by mouth daily.      fenofibrate (TRIGLIDE/LOFIBRA) 160 MG tablet Take 1 tablet (160 mg) by mouth daily 90 tablet 3    FLONASE INHA 50 MCG/DOSE NA INHALE 2 SPRAYS IN EACH NOSTRIL ONCE DAILY (Patient taking differently: No sig reported) 3 MONTHS 1 YEAR    hydrochlorothiazide (MICROZIDE) 12.5 MG capsule Take 1 capsule (12.5 mg) by mouth daily for 53 days 53 capsule 0    irbesartan (AVAPRO) 150 MG tablet Take 1 tablet (150 mg) by mouth daily 90 tablet 1    metFORMIN (GLUCOPHAGE XR) 500 MG 24 hr tablet Take 1,000 mg by mouth      metoprolol tartrate (LOPRESSOR) 25 MG tablet Take 1 tablet (25 mg) by mouth 2 times daily 180 tablet 3    nitroGLYcerin (NITROSTAT) 0.4 MG sublingual tablet Place 1 tablet (0.4 mg) under the tongue every 5 minutes as needed for chest pain 25 tablet 1    pantoprazole (PROTONIX) 20 MG EC tablet Take 40 mg by mouth daily       sertraline (ZOLOFT) 50 MG tablet Take 0.5 tablets (25 mg) by mouth daily 30 tablet 1       ALLERGIES     Allergies   Allergen Reactions    Codeine        PAST MEDICAL HISTORY:  Past Medical History:   Diagnosis Date    CAD (coronary artery disease)     stent 2/9/15    Depression     Dyslipidemia     Hypertension     NSTEMI (non-ST elevated myocardial infarction) (H) 02-06-15    Tobacco abuse        PAST SURGICAL HISTORY:  Past Surgical History:   Procedure Laterality Date    COLECTOMY RIGHT Right 6/3/2016    Procedure: COLECTOMY RIGHT;  Surgeon: Dillan Vaughn MD;  Location: [facility] OR    HEART CATH LEFT HEART CATH  02-09-15    SHE to RCA, Aspiration thrombectomy of RCA, 20-30% proximal LAD narrowing, 30% mid LAD, 40% mid first diagonal, prox RCA was occluded with 30% in distal RCA     HEART CATH, ANGIOPLASTY      LAPAROSCOPIC APPENDECTOMY N/A 6/3/2016    Procedure: LAPAROSCOPIC APPENDECTOMY;  Surgeon: Dillan Vaughn MD;  Location:  OR    ZZC ANESTH,RADICAL HYSTERECTOMY         FAMILY HISTORY:  Family History   Problem Relation Age of Onset    Unknown/Adopted Mother     Unknown/Adopted Father        SOCIAL HISTORY:  Social History     Socioeconomic History    Marital status: Single   Tobacco Use    Smoking status: Former     Packs/day: 1.00     Types: Cigarettes     Start date:      Quit date:      Years since quittin.2    Smokeless tobacco: Never    Tobacco comments:     2/6/15 - quit e cig   Substance and Sexual Activity    Alcohol use: No     Alcohol/week: 0.0 standard drinks    Drug use: No    Sexual activity: Yes     Partners: Male     Birth control/protection: Surgical   Other Topics Concern    Caffeine Concern Yes     Comment: 1 can of diet soda and once in a while a coffee     Sleep Concern Yes     Comment: not lately    Stress Concern Yes     Comment: Significant other - Terminal Cancer    Special Diet No    Exercise Yes     Comment: some 2 days per week     Seat Belt Yes        General Appearance: No distress, normal body habitus, upright.  ENT/Mouth:  Normal head shape, no evidence of injury or laceration.  EYES: No scleral icterus, normal conjunctivae  Neck:  No evidence of thyromegaly  Chest/Lungs: No audible wheezing. Non labored breathing. No cough.  Cardiovascular: No evidence of elevated jugular venous pressure.   Skin: No xanthelasma, normal skin collar. No evidence of facial lacerations.  Neurologic: No focal neurological defect. Normal speech.  Psychiatric: Alert and oriented x3      Thank you for allowing me to participate in the care of your patient.      Sincerely,     Aime Muniz MD     [facility]  cc:   No referring provider defined for this encounter.

## 2023-03-31 NOTE — PROGRESS NOTES
Tania is a 62 year old who is being evaluated via a billable video visit.      How would you like to obtain your AVS? MyChart  If the video visit is dropped, the invitation should be resent by: Text to cell phone: [phone]  Will anyone else be joining your video visit? No    Review Of Systems  Skin: negative  Eyes: negative  Ears/Nose/Throat: negative  Respiratory: No shortness of breath, dyspnea on exertion, cough, or hemoptysis  Cardiovascular: negative  Gastrointestinal: negative  Genitourinary: negative  Musculoskeletal: negative  Neurologic: negative  Psychiatric: negative  Hematologic/Lymphatic/Immunologic: negative  Endocrine: negativeReview Of Systems  Skin: negative  Eyes: negative  Ears/Nose/Throat: negative  Respiratory: No shortness of breath, dyspnea on exertion, cough, or hemoptysis  Cardiovascular: negative  Gastrointestinal: negative  Genitourinary: negative  Musculoskeletal: negative  Neurologic: negative  Psychiatric: negative  Hematologic/Lymphatic/Immunologic: negative  Endocrine: negative    Video-Visit Details  Leonid    Start: 9.30  End: 10.00    Type of service:  Video Visit     [place] Moise    Originating Location (pt. Location): Home    Distant Location (provider location):  On-site  Platform used for Video Visit: Well     This clinic visit was performed via telephone/video .    Today, I had the pleasure of connecting with Tania Azevedo for a follow-up visit.  She is a pleasant 62 year old patient with a past medical history of inferior myocardial infarction status post PCI in 2015, also noted to have moderate disease in LAD and D1, former smoker, ischemic cardiomyopathy with ejection fraction of 45-50%, hypertension, hyperlipidemia and metabolic syndrome who presents for a follow-up appointment.       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  She requests a letter for DOT clearance. She lives a sedentary life and does not walk or exercise regularly.  However, her work  involves lifting and carrying equipment like wheelchair and passengers in and out of the bus and she is able to do all this without any issues. She does chest pain, SOB at rest or on exertion.  Blood work in the past has shown LDL at target levels but TG have always been high. She takes 40 mg of Lipitor.   Her other medications include low-dose aspirin, beta-blocker and angiotensin receptor blocker.    Last echocardiogram which I interpreted today shows, Normal bi-v size and function. Last EKG shows sinus rhythm.      Assessment and plan:   1.  Inferior myocardial infarction status post PCI in 2015  2.  Mild-moderate disease in LAD and D1, and distal RCA, 30 to 40%.  3.  Hypertension- controlled  4.  Hyperlipidemia  5.  Obesity  6.  DOT clearance     It was a pleasure to meet Ms Azevedo in clinic today. She does not exercise or do any sort of physical exertion besides work where she has to do quite a bit of lifting and moving things like wheelchairs and passengers in and out of the bus. She is asymptomatic on doing this work.     She ahd PCI of RCA after inferior MI and has mild to moderate disease in the distal RCA and LAD territory whic is being managed medically.   Stress test performed in 2020 was negative for ischemia.  I do not see any cardiac reasons that would prohibit her from doing her job. For this reason, we will provide her with a clearance letter.  I will have her come back and see me in a year or sooner if needed.  I will continue all her medications at the current dose for now.      Plan  1.   Will provide you with a DOT clearance letter.  2.  Continue all other medications at the current dose. Make healthy dietary choices and exercise regularly. Start fenofibrate.   3.  Follow-up with me in a year or sooner if needed    Thank you for allowing me to participate in the care of Tania Azevedo    This note was completed in part using Dragon voice recognition software. Although reviewed after completion,  some word and grammatical errors may occur.    Aime Muniz MD  Cardiology    No orders of the defined types were placed in this encounter.      Orders Placed This Encounter   Medications     metFORMIN (GLUCOPHAGE XR) 500 MG 24 hr tablet     Sig: Take 1,000 mg by mouth       There are no discontinued medications.    Encounter Diagnosis   Name Primary?     Essential hypertension Yes       CURRENT MEDICATIONS:  Current Outpatient Medications   Medication Sig Dispense Refill     albuterol (PROVENTIL HFA: VENTOLIN HFA) 108 (90 BASE) MCG/ACT inhaler Inhale 2 puffs into the lungs every 6 hours. 1 Inhaler 2     aspirin 81 MG chewable tablet Take 1 tablet (81 mg) by mouth daily 30 tablet 0     atorvastatin (LIPITOR) 40 MG tablet Take 1 tablet (40 mg) by mouth daily Follow-up with [facility] for more refills. 90 tablet 3     cetirizine (ZYRTEC) 10 MG tablet Take 10 mg by mouth daily.       fenofibrate (TRIGLIDE/LOFIBRA) 160 MG tablet Take 1 tablet (160 mg) by mouth daily 90 tablet 3     FLONASE INHA 50 MCG/DOSE NA INHALE 2 SPRAYS IN EACH NOSTRIL ONCE DAILY (Patient taking differently: No sig reported) 3 MONTHS 1 YEAR     hydrochlorothiazide (MICROZIDE) 12.5 MG capsule Take 1 capsule (12.5 mg) by mouth daily for 53 days 53 capsule 0     irbesartan (AVAPRO) 150 MG tablet Take 1 tablet (150 mg) by mouth daily 90 tablet 1     metFORMIN (GLUCOPHAGE XR) 500 MG 24 hr tablet Take 1,000 mg by mouth       metoprolol tartrate (LOPRESSOR) 25 MG tablet Take 1 tablet (25 mg) by mouth 2 times daily 180 tablet 3     nitroGLYcerin (NITROSTAT) 0.4 MG sublingual tablet Place 1 tablet (0.4 mg) under the tongue every 5 minutes as needed for chest pain 25 tablet 1     pantoprazole (PROTONIX) 20 MG EC tablet Take 40 mg by mouth daily       sertraline (ZOLOFT) 50 MG tablet Take 0.5 tablets (25 mg) by mouth daily 30 tablet 1       ALLERGIES     Allergies   Allergen Reactions     Codeine        PAST MEDICAL HISTORY:  Past Medical History:    Diagnosis Date     CAD (coronary artery disease)     stent 2/9/15     Depression      Dyslipidemia      Hypertension      NSTEMI (non-ST elevated myocardial infarction) (H) 02-06-15     Tobacco abuse        PAST SURGICAL HISTORY:  Past Surgical History:   Procedure Laterality Date     COLECTOMY RIGHT Right 6/3/2016    Procedure: COLECTOMY RIGHT;  Surgeon: Dillan Vaughn MD;  Location:  OR     HEART CATH LEFT HEART CATH  02-09-15    SHE to RCA, Aspiration thrombectomy of RCA, 20-30% proximal LAD narrowing, 30% mid LAD, 40% mid first diagonal, prox RCA was occluded with 30% in distal RCA      HEART CATH, ANGIOPLASTY       LAPAROSCOPIC APPENDECTOMY N/A 6/3/2016    Procedure: LAPAROSCOPIC APPENDECTOMY;  Surgeon: Dillan Vaughn MD;  Location:  OR     [facility] ANESTH,RADICAL HYSTERECTOMY         FAMILY HISTORY:  Family History   Problem Relation Age of Onset     Unknown/Adopted Mother      Unknown/Adopted Father        SOCIAL HISTORY:  Social History     Socioeconomic History     Marital status: Single   Tobacco Use     Smoking status: Former     Packs/day: 1.00     Types: Cigarettes     Start date:      Quit date:      Years since quittin.2     Smokeless tobacco: Never     Tobacco comments:     2/6/15 - quit e cig   Substance and Sexual Activity     Alcohol use: No     Alcohol/week: 0.0 standard drinks     Drug use: No     Sexual activity: Yes     Partners: Male     Birth control/protection: Surgical   Other Topics Concern     Caffeine Concern Yes     Comment: 1 can of diet soda and once in a while a coffee      Sleep Concern Yes     Comment: not lately     Stress Concern Yes     Comment: Significant other - Terminal Cancer     Special Diet No     Exercise Yes     Comment: some 2 days per week      Seat Belt Yes       General Appearance: No distress, normal body habitus, upright.  ENT/Mouth:  Normal head shape, no evidence of injury or laceration.  EYES: No scleral icterus, normal  conjunctivae  Neck:  No evidence of thyromegaly  Chest/Lungs: No audible wheezing. Non labored breathing. No cough.  Cardiovascular: No evidence of elevated jugular venous pressure.   Skin: No xanthelasma, normal skin collar. No evidence of facial lacerations.  Neurologic: No focal neurological defect. Normal speech.  Psychiatric: Alert and oriented x3

## 2023-04-05 ENCOUNTER — TELEPHONE (OUTPATIENT)
Dept: CARDIOLOGY | Facility: CLINIC | Age: 63
End: 2023-04-05
Payer: COMMERCIAL

## 2023-04-05 NOTE — TELEPHONE ENCOUNTER
[facility]    Phone Message    May a detailed message be left on voicemail: yes     Reason for Call: Other: Patient was to call and report b/p reading   4/5 - 133/75 8:45 am  114/74 11:45 am   Patient also wants make sure that the DOT form was sent as well. Please call patient to discuss.    Action Taken: Other: cardiology    Travel Screening: Not Applicable   Thank you!  [facility]

## 2023-04-05 NOTE — TELEPHONE ENCOUNTER
Writer spoke with Tania.  She said that she does need the DOT letter, but wanted it to be sent to her home.  Writer cannot tell if this was done or not, due to the Epic upgrade that just occurred, so I am sending out another copy to her, Dr. Muniz will be in this clinic on 4\6, so will be able to mail it then.    Writer informed Tania that her BP looks to be in control, and thanked her for monitoring this, and to keep up with checking it once in a while.   Instructed her that people do not feel it if their BP gets up higher, and it is better to know what the BP readings are so we can make adjustments before any problems occur.    She verbalized understanding.    Barbara Betancourt RN on 4/5/2023 at 4:01 PM

## 2023-04-05 NOTE — LETTER
[facility]  [address]  [place] [facility] [place] [address]  Phone: [phone]  Fax: [phone]       March 31, 2023        Tania Azevedo  [address]  [place]  [address]    To Whom it May Concern:    To Whom it may concern:        Ms Tania Azevedo is under my professional care at [facility]. She was seen by me on 3\31\2023. I found her cardiovascular status to be stable and free of any concerning symptoms. I did not perform any cardiac imaging studies on her this year.     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 If you need additional information, I can be reached at [phone].    Best Regards,        Aime Muniz MD  [facility]

## 2023-04-06 ENCOUNTER — DOCUMENTATION ONLY (OUTPATIENT)
Dept: CARDIOLOGY | Facility: CLINIC | Age: 63
End: 2023-04-06
Payer: COMMERCIAL

## 2023-04-15 ENCOUNTER — HEALTH MAINTENANCE LETTER (OUTPATIENT)
Age: 63
End: 2023-04-15

## 2023-05-04 DIAGNOSIS — I10 BENIGN ESSENTIAL HYPERTENSION: ICD-10-CM

## 2023-05-04 DIAGNOSIS — I21.4 NSTEMI (NON-ST ELEVATED MYOCARDIAL INFARCTION) (H): ICD-10-CM

## 2023-05-04 RX ORDER — HYDROCHLOROTHIAZIDE 12.5 MG/1
12.5 CAPSULE ORAL DAILY
Qty: 90 CAPSULE | Refills: 3 | Status: SHIPPED | OUTPATIENT
Start: 2023-05-04 | End: 2024-04-24

## 2023-05-04 RX ORDER — IRBESARTAN 150 MG/1
150 TABLET ORAL DAILY
Qty: 90 TABLET | Refills: 3 | Status: SHIPPED | OUTPATIENT
Start: 2023-05-04 | End: 2024-04-24

## 2023-05-04 RX ORDER — ATORVASTATIN CALCIUM 40 MG/1
40 TABLET, FILM COATED ORAL DAILY
Qty: 90 TABLET | Refills: 3 | Status: SHIPPED | OUTPATIENT
Start: 2023-05-04 | End: 2024-04-24

## 2023-10-31 DIAGNOSIS — I10 BENIGN ESSENTIAL HYPERTENSION: ICD-10-CM

## 2023-10-31 DIAGNOSIS — I25.10 CORONARY ARTERY DISEASE INVOLVING NATIVE CORONARY ARTERY OF NATIVE HEART WITHOUT ANGINA PECTORIS: ICD-10-CM

## 2023-10-31 DIAGNOSIS — I21.4 NSTEMI (NON-ST ELEVATED MYOCARDIAL INFARCTION) (H): ICD-10-CM

## 2023-10-31 RX ORDER — FENOFIBRATE 160 MG/1
160 TABLET ORAL DAILY
Qty: 90 TABLET | Refills: 1 | Status: SHIPPED | OUTPATIENT
Start: 2023-10-31 | End: 2024-02-20

## 2024-01-31 DIAGNOSIS — I21.4 NSTEMI (NON-ST ELEVATED MYOCARDIAL INFARCTION) (H): ICD-10-CM

## 2024-01-31 RX ORDER — METOPROLOL TARTRATE 25 MG/1
25 TABLET, FILM COATED ORAL 2 TIMES DAILY
Qty: 180 TABLET | Refills: 0 | Status: SHIPPED | OUTPATIENT
Start: 2024-01-31 | End: 2024-04-24

## 2024-02-20 DIAGNOSIS — I10 ESSENTIAL HYPERTENSION: ICD-10-CM

## 2024-02-20 DIAGNOSIS — I21.4 NSTEMI (NON-ST ELEVATED MYOCARDIAL INFARCTION) (H): Primary | ICD-10-CM

## 2024-02-20 DIAGNOSIS — I10 BENIGN ESSENTIAL HYPERTENSION: ICD-10-CM

## 2024-02-20 DIAGNOSIS — I25.10 CORONARY ARTERY DISEASE INVOLVING NATIVE CORONARY ARTERY OF NATIVE HEART WITHOUT ANGINA PECTORIS: ICD-10-CM

## 2024-02-20 RX ORDER — FENOFIBRATE 160 MG/1
160 TABLET ORAL DAILY
Qty: 90 TABLET | Refills: 0 | Status: SHIPPED | OUTPATIENT
Start: 2024-02-20 | End: 2024-04-25

## 2024-02-20 NOTE — LETTER
February 20, 2024       TO: Tania Azevedo  [address]  [place] [address]       Dear Tania Azevedo,    You have requested a medication refill for Fenofibrate.  I have sent in the one time refill.   Our records indicate that you do not have an appointment for 2024 to see one of our providers for your annual office visit.  You will also need fasting blood tests for your cholesterol levels.   Please note that you just need to have these appointments scheduled in the next month or two, so we can continue to refill your other prescriptions.    Please call our clinic at [phone] to schedule your appointment as soon as possible.    Thank you,    Barbara WEBSTER    [facility]

## 2024-02-20 NOTE — TELEPHONE ENCOUNTER
Team received request from Tania for refill of Fenofibrate  160mg tab daily.       Last OV   March 2023 with Dr. Muniz.  Orders entered today for the follow up apmnt and labs from that apmnt    Refill sent to [facility] pharmacy in [place].     Call placed to Tania to inform of the need to get apmnt scheduled.  I had to LVM.    Letter mailed as well.       Barbara Betancourt RN on 2/20/2024 at 8:50 AM

## 2024-04-01 ENCOUNTER — LAB (OUTPATIENT)
Dept: LAB | Facility: CLINIC | Age: 64
End: 2024-04-01
Payer: COMMERCIAL

## 2024-04-01 DIAGNOSIS — I21.4 NSTEMI (NON-ST ELEVATED MYOCARDIAL INFARCTION) (H): ICD-10-CM

## 2024-04-01 DIAGNOSIS — I25.10 CORONARY ARTERY DISEASE INVOLVING NATIVE CORONARY ARTERY OF NATIVE HEART WITHOUT ANGINA PECTORIS: ICD-10-CM

## 2024-04-01 DIAGNOSIS — I10 ESSENTIAL HYPERTENSION: ICD-10-CM

## 2024-04-01 LAB
ALT SERPL W P-5'-P-CCNC: 25 U/L (ref 0–50)
CHOLEST SERPL-MCNC: 125 MG/DL
FASTING STATUS PATIENT QL REPORTED: YES
HDLC SERPL-MCNC: 38 MG/DL
LDLC SERPL CALC-MCNC: 51 MG/DL
NONHDLC SERPL-MCNC: 87 MG/DL
TRIGL SERPL-MCNC: 179 MG/DL

## 2024-04-01 PROCEDURE — 84460 ALANINE AMINO (ALT) (SGPT): CPT

## 2024-04-01 PROCEDURE — 80061 LIPID PANEL: CPT

## 2024-04-01 PROCEDURE — 36415 COLL VENOUS BLD VENIPUNCTURE: CPT

## 2024-04-22 ENCOUNTER — VIRTUAL VISIT (OUTPATIENT)
Dept: CARDIOLOGY | Facility: CLINIC | Age: 64
End: 2024-04-22
Attending: INTERNAL MEDICINE
Payer: COMMERCIAL

## 2024-04-22 DIAGNOSIS — I10 ESSENTIAL HYPERTENSION: ICD-10-CM

## 2024-04-22 DIAGNOSIS — I25.10 CORONARY ARTERY DISEASE INVOLVING NATIVE CORONARY ARTERY OF NATIVE HEART WITHOUT ANGINA PECTORIS: ICD-10-CM

## 2024-04-22 DIAGNOSIS — I21.4 NSTEMI (NON-ST ELEVATED MYOCARDIAL INFARCTION) (H): ICD-10-CM

## 2024-04-22 PROCEDURE — 99214 OFFICE O/P EST MOD 30 MIN: CPT | Mod: 95 | Performed by: INTERNAL MEDICINE

## 2024-04-22 NOTE — LETTER
4/22/2024    Judah Alexandre,   [address]  [place] [address]    RE: Taniakermit Duffon       Dear Colleague,     I had the pleasure of seeing Tania Azevedo in the [facility].  Tania is a 63 year old who is being evaluated via a billable video visit.    How would you like to obtain your AVS? MyChart  If the video visit is dropped, the invitation should be resent by: Send to e-mail at: [email or web address]  Will anyone else be joining your video visit? No    Video-Visit Details    Type of service:  Video Visit   Originating Location (pt. Location): Home    Distant Location (provider location):  On-site  Platform used for Video Visit: Hopster TV       Review Of Systems  Respiratory: NEGATIVE  Cardiovascular:NEGATIVE  Endocrine:  NEGATIVE    Telephone number of patient: [phone]    Start: 5.00  End: 5.25     Type of service:  Video Visit      Originating Location (pt. Location): Home  Distant Location (provider location):  On-site  Platform used for Video Visit: Kamryn      This clinic visit was performed via telephone/video .     Today, I had the pleasure of connecting with Tania Azevedo for a follow-up visit.  She is a pleasant 63 year old patient with a past medical history of inferior myocardial infarction status post PCI in 2015, also noted to have moderate disease in LAD and D1, former smoker, ischemic cardiomyopathy with ejection fraction of 45-50%, hypertension, hyperlipidemia and metabolic syndrome who presents for a follow-up appointment.       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  She requests a letter for DOT clearance. She lives a sedentary life and does not walk or exercise regularly.  However, her work involves lifting and carrying equipment like wheelchair and passengers in and out of the bus and she is able to do all this without any issues. She does chest pain, SOB at rest or on exertion.  Blood work in the past has shown LDL at target levels but TG have  always been high. She takes 40 mg of Lipitor.   Her other medications include low-dose aspirin, beta-blocker and angiotensin receptor blocker.     Last echocardiogram which I interpreted today shows, Normal bi-v size and function. Last EKG shows sinus rhythm.      Assessment and plan:   1.  Inferior myocardial infarction status post PCI in 2015  2.  Mild-moderate disease in LAD and D1, and distal RCA, 30 to 40%.  3.  Hypertension- controlled  4.  Hyperlipidemia  5.  Obesity  6.  DOT clearance     It was a pleasure to meet Ms Azevedo in clinic today. She does not exercise or do any sort of physical exertion besides work where she has to do quite a bit of lifting and moving things like wheelchairs and passengers in and out of the bus. She is asymptomatic on doing this work.      She had PCI of RCA after inferior MI and has mild to moderate disease in the distal RCA and LAD territory which is being managed medically.   Stress test performed in 2020 was negative for ischemia.  I do not see any cardiac reasons that would prohibit her from doing her job. For this reason, we will provide her with a clearance letter.  I will have her come back and see me in a year or sooner if needed.  I will continue all her medications at the current dose for now.      Plan:-  1.  Will provide with a DOT clearance letter.  2.  Continue all other medications at the current dose. Make healthy dietary choices and exercise regularly.   3.  Follow-up with me in a year or sooner if needed     Thank you for allowing me to participate in the care of Tania Azevedo     This note was completed in part using Dragon voice recognition software. Although reviewed after completion, some word and grammatical errors may occur.     Aime Muniz MD  Cardiology      Thank you for allowing me to participate in the care of your patient.      Sincerely,     Aime Muniz MD     [facility]  cc:   Aime  MD Flavio  [address]  [place] [id number]

## 2024-04-22 NOTE — PROGRESS NOTES
Tania is a 63 year old who is being evaluated via a billable video visit.    How would you like to obtain your AVS? MyChart  If the video visit is dropped, the invitation should be resent by: Send to e-mail at: [email or web address]  Will anyone else be joining your video visit? No    Video-Visit Details    Type of service:  Video Visit   Originating Location (pt. Location): Home    Distant Location (provider location):  On-site  Platform used for Video Visit: Kamryn       Review Of Systems  Respiratory: NEGATIVE  Cardiovascular:NEGATIVE  Endocrine:  NEGATIVE    Telephone number of patient: [phone]    Start: 5.00  End: 5.25     Type of service:  Video Visit      Originating Location (pt. Location): Home  Distant Location (provider location):  On-site  Platform used for Video Visit: Kamryn      This clinic visit was performed via telephone/video .     Today, I had the pleasure of connecting with Tania Azevedo for a follow-up visit.  She is a pleasant 63 year old patient with a past medical history of inferior myocardial infarction status post PCI in 2015, also noted to have moderate disease in LAD and D1, former smoker, ischemic cardiomyopathy with ejection fraction of 45-50%, hypertension, hyperlipidemia and metabolic syndrome who presents for a follow-up appointment.       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  She requests a letter for DOT clearance. She lives a sedentary life and does not walk or exercise regularly.  However, her work involves lifting and carrying equipment like wheelchair and passengers in and out of the bus and she is able to do all this without any issues. She does chest pain, SOB at rest or on exertion.  Blood work in the past has shown LDL at target levels but TG have always been high. She takes 40 mg of Lipitor.   Her other medications include low-dose aspirin, beta-blocker and angiotensin receptor blocker.     Last echocardiogram which I interpreted today shows,  Normal bi-v size and function. Last EKG shows sinus rhythm.      Assessment and plan:   1.  Inferior myocardial infarction status post PCI in 2015  2.  Mild-moderate disease in LAD and D1, and distal RCA, 30 to 40%.  3.  Hypertension- controlled  4.  Hyperlipidemia  5.  Obesity  6.  DOT clearance     It was a pleasure to meet Ms Azevedo in clinic today. She does not exercise or do any sort of physical exertion besides work where she has to do quite a bit of lifting and moving things like wheelchairs and passengers in and out of the bus. She is asymptomatic on doing this work.      She had PCI of RCA after inferior MI and has mild to moderate disease in the distal RCA and LAD territory which is being managed medically.   Stress test performed in 2020 was negative for ischemia.  I do not see any cardiac reasons that would prohibit her from doing her job. For this reason, we will provide her with a clearance letter.  I will have her come back and see me in a year or sooner if needed.  I will continue all her medications at the current dose for now.      Plan:-  1.  Will provide with a DOT clearance letter.  2.  Continue all other medications at the current dose. Make healthy dietary choices and exercise regularly.   3.  Follow-up with me in a year or sooner if needed     Thank you for allowing me to participate in the care of Tania Azevedo     This note was completed in part using Dragon voice recognition software. Although reviewed after completion, some word and grammatical errors may occur.     Aime Muniz MD  Cardiology

## 2024-04-24 DIAGNOSIS — I21.4 NSTEMI (NON-ST ELEVATED MYOCARDIAL INFARCTION) (H): ICD-10-CM

## 2024-04-24 DIAGNOSIS — I10 BENIGN ESSENTIAL HYPERTENSION: ICD-10-CM

## 2024-04-24 RX ORDER — HYDROCHLOROTHIAZIDE 12.5 MG/1
12.5 CAPSULE ORAL DAILY
Qty: 90 CAPSULE | Refills: 3 | Status: SHIPPED | OUTPATIENT
Start: 2024-04-24

## 2024-04-24 RX ORDER — ATORVASTATIN CALCIUM 40 MG/1
40 TABLET, FILM COATED ORAL DAILY
Qty: 90 TABLET | Refills: 3 | Status: SHIPPED | OUTPATIENT
Start: 2024-04-24

## 2024-04-24 RX ORDER — IRBESARTAN 150 MG/1
150 TABLET ORAL DAILY
Qty: 90 TABLET | Refills: 3 | Status: SHIPPED | OUTPATIENT
Start: 2024-04-24

## 2024-04-24 RX ORDER — NITROGLYCERIN 0.4 MG/1
0.4 TABLET SUBLINGUAL EVERY 5 MIN PRN
Qty: 25 TABLET | Refills: 1 | Status: SHIPPED | OUTPATIENT
Start: 2024-04-24

## 2024-04-24 RX ORDER — METOPROLOL TARTRATE 25 MG/1
25 TABLET, FILM COATED ORAL 2 TIMES DAILY
Qty: 180 TABLET | Refills: 0 | Status: SHIPPED | OUTPATIENT
Start: 2024-04-24 | End: 2024-07-31

## 2024-04-24 NOTE — TELEPHONE ENCOUNTER
Received refill request from Tania.  Asked for us to send to [facility]'s pharmacy on [address].  Atorvastatin                 40mg daily  Hydrochlorothiazide    12.5mg daily  Irbesartan                   150mg daily  Metoprolol Tartrate       25mg twice daily  Nitglycerin                    0.4mg as needed for chest pain    Virtual visit with Dr. Muniz yesterday.  4\23\24    [facility] Refill Guideline reviewed.  Medication meets criteria for refill.    Barbara Betancourt RN on 4/24/2024 at 3:38 PM

## 2024-04-25 DIAGNOSIS — I25.10 CORONARY ARTERY DISEASE INVOLVING NATIVE CORONARY ARTERY OF NATIVE HEART WITHOUT ANGINA PECTORIS: ICD-10-CM

## 2024-04-25 DIAGNOSIS — I10 BENIGN ESSENTIAL HYPERTENSION: ICD-10-CM

## 2024-04-25 DIAGNOSIS — I21.4 NSTEMI (NON-ST ELEVATED MYOCARDIAL INFARCTION) (H): ICD-10-CM

## 2024-04-25 RX ORDER — FENOFIBRATE 160 MG/1
160 TABLET ORAL DAILY
Qty: 90 TABLET | Refills: 3 | Status: SHIPPED | OUTPATIENT
Start: 2024-04-25

## 2024-04-25 NOTE — TELEPHONE ENCOUNTER
Received refill request for Fenofibrate 160mg tablets, per [facility] pharmacy.    Last OV      4\22\24  Dr. Aime Muniz    [facility] Card[facility] Refill Guideline reviewed.  Medication meets criteria for refill.    Barbara Betancourt RN on 4/25/2024 at 5:12 PM

## 2024-05-21 ENCOUNTER — TELEPHONE (OUTPATIENT)
Dept: CARDIOLOGY | Facility: CLINIC | Age: 64
End: 2024-05-21
Payer: COMMERCIAL

## 2024-05-21 NOTE — TELEPHONE ENCOUNTER
Patient needs to have DOT letter by June 1.   She was seen in clinic by Dr. Muniz on April 22nd.   Writer has printed this letter and put in the mail for patient.    Co-worker Tez WEBSTER spoke with Tania.    Barbara Betancourt RN on 5/21/2024 at 8:39 AM

## 2024-05-21 NOTE — LETTER
April 22, 2024        Tania Azevedo  [address]  [place]  [address]    To Whom it May Concern:      Ms Tania Azevedo is under my professional care at [facility]. She was seen by me on April 22nd, 2024.  I found her cardiovascular status to be stable and free of any concerning symptoms. I did not perform any cardiac imaging studies on her this year.     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 If you need additional information, I can be reached at [phone].    Best Regards,        Aime Muniz MD [facility]

## 2024-06-22 ENCOUNTER — HEALTH MAINTENANCE LETTER (OUTPATIENT)
Age: 64
End: 2024-06-22

## 2024-07-31 DIAGNOSIS — I21.4 NSTEMI (NON-ST ELEVATED MYOCARDIAL INFARCTION) (H): ICD-10-CM

## 2024-07-31 RX ORDER — METOPROLOL TARTRATE 25 MG/1
25 TABLET, FILM COATED ORAL 2 TIMES DAILY
Qty: 180 TABLET | Refills: 3 | Status: SHIPPED | OUTPATIENT
Start: 2024-07-31

## 2024-11-09 ENCOUNTER — HEALTH MAINTENANCE LETTER (OUTPATIENT)
Age: 64
End: 2024-11-09

## 2025-03-17 ENCOUNTER — TELEPHONE (OUTPATIENT)
Dept: CARDIOLOGY | Facility: CLINIC | Age: 65
End: 2025-03-17
Payer: COMMERCIAL

## 2025-03-17 DIAGNOSIS — I25.10 CORONARY ARTERY DISEASE INVOLVING NATIVE CORONARY ARTERY OF NATIVE HEART WITHOUT ANGINA PECTORIS: ICD-10-CM

## 2025-03-17 DIAGNOSIS — I10 ESSENTIAL HYPERTENSION: ICD-10-CM

## 2025-03-17 DIAGNOSIS — I21.4 NSTEMI (NON-ST ELEVATED MYOCARDIAL INFARCTION) (H): Primary | ICD-10-CM

## 2025-03-17 DIAGNOSIS — I10 BENIGN ESSENTIAL HYPERTENSION: ICD-10-CM

## 2025-03-17 NOTE — TELEPHONE ENCOUNTER
Patient called LM regarding labs prior to annual OV  Lipid profile and ALT orders placed    Spoke with patient  Transferred to scheduling to set up appointment- did advise that IN PERSON needed    Amirah Matias RN on 3/17/2025 at 9:16 AM

## 2025-04-19 ENCOUNTER — HEALTH MAINTENANCE LETTER (OUTPATIENT)
Age: 65
End: 2025-04-19

## 2025-05-01 ENCOUNTER — TELEPHONE (OUTPATIENT)
Dept: CARDIOLOGY | Facility: CLINIC | Age: 65
End: 2025-05-01

## 2025-05-01 NOTE — TELEPHONE ENCOUNTER
Patient needing DOT letter for employment, was seen today by Juana Ruiz, who sent us the request for the letter to be written up    Barbara Betancourt RN on 5/1/2025 at 2:34 PM

## 2025-05-01 NOTE — LETTER
May 1, 2025        Tania Azevedo  [address]  [place]  [address]          To Whom it may concern:        Ms Tania Azevedo is under our professional care at [facility]. She is under my primary cardiovascular care, and  was seen by our team member Adeline CARMEN CNP, on 5\1\2025.  Her cardiovascular status is stable and free of any concerning symptoms. No cardiac imaging studies were required this year.      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     If you need additional information, please call; [phone].         Best Regards,           Aime Muniz MD  [facility]